# Patient Record
Sex: FEMALE | Race: WHITE | Employment: OTHER | URBAN - METROPOLITAN AREA
[De-identification: names, ages, dates, MRNs, and addresses within clinical notes are randomized per-mention and may not be internally consistent; named-entity substitution may affect disease eponyms.]

---

## 2019-10-15 ENCOUNTER — HOSPITAL ENCOUNTER (EMERGENCY)
Age: 84
Discharge: HOME | End: 2019-10-15
Attending: EMERGENCY MEDICINE
Payer: MEDICARE

## 2019-10-15 ENCOUNTER — APPOINTMENT (OUTPATIENT)
Dept: GENERAL RADIOLOGY | Age: 84
End: 2019-10-15
Attending: EMERGENCY MEDICINE
Payer: MEDICARE

## 2019-10-15 VITALS
RESPIRATION RATE: 20 BRPM | TEMPERATURE: 99.5 F | HEIGHT: 60 IN | DIASTOLIC BLOOD PRESSURE: 84 MMHG | OXYGEN SATURATION: 93 % | WEIGHT: 170 LBS | SYSTOLIC BLOOD PRESSURE: 140 MMHG | BODY MASS INDEX: 33.38 KG/M2 | HEART RATE: 82 BPM

## 2019-10-15 DIAGNOSIS — K59.00 CONSTIPATION, UNSPECIFIED CONSTIPATION TYPE: ICD-10-CM

## 2019-10-15 DIAGNOSIS — J18.9 PNEUMONIA OF RIGHT LOWER LOBE DUE TO INFECTIOUS ORGANISM: Primary | ICD-10-CM

## 2019-10-15 DIAGNOSIS — N30.00 ACUTE CYSTITIS WITHOUT HEMATURIA: ICD-10-CM

## 2019-10-15 LAB
ALBUMIN SERPL-MCNC: 3.4 G/DL (ref 3.5–5)
ALBUMIN/GLOB SERPL: 0.8 {RATIO} (ref 1.1–2.2)
ALP SERPL-CCNC: 92 U/L (ref 45–117)
ALT SERPL-CCNC: 36 U/L (ref 12–78)
ANION GAP SERPL CALC-SCNC: 5 MMOL/L (ref 5–15)
APPEARANCE UR: ABNORMAL
AST SERPL-CCNC: 32 U/L (ref 15–37)
BACTERIA URNS QL MICRO: ABNORMAL /HPF
BASOPHILS # BLD: 0 K/UL (ref 0–0.1)
BASOPHILS NFR BLD: 0 % (ref 0–1)
BILIRUB SERPL-MCNC: 1.2 MG/DL (ref 0.2–1)
BILIRUB UR QL: NEGATIVE
BUN SERPL-MCNC: 16 MG/DL (ref 6–20)
BUN/CREAT SERPL: 37 (ref 12–20)
CALCIUM SERPL-MCNC: 9.3 MG/DL (ref 8.5–10.1)
CHLORIDE SERPL-SCNC: 96 MMOL/L (ref 97–108)
CO2 SERPL-SCNC: 33 MMOL/L (ref 21–32)
COLOR UR: ABNORMAL
CREAT SERPL-MCNC: 0.43 MG/DL (ref 0.55–1.02)
DIFFERENTIAL METHOD BLD: ABNORMAL
EOSINOPHIL # BLD: 0 K/UL (ref 0–0.4)
EOSINOPHIL NFR BLD: 0 % (ref 0–7)
EPITH CASTS URNS QL MICRO: ABNORMAL /LPF
ERYTHROCYTE [DISTWIDTH] IN BLOOD BY AUTOMATED COUNT: 12.9 % (ref 11.5–14.5)
GLOBULIN SER CALC-MCNC: 4.2 G/DL (ref 2–4)
GLUCOSE SERPL-MCNC: 117 MG/DL (ref 65–100)
GLUCOSE UR STRIP.AUTO-MCNC: NEGATIVE MG/DL
HCT VFR BLD AUTO: 43.6 % (ref 35–47)
HGB BLD-MCNC: 13.5 G/DL (ref 11.5–16)
HGB UR QL STRIP: NEGATIVE
IMM GRANULOCYTES # BLD AUTO: 0 K/UL (ref 0–0.04)
IMM GRANULOCYTES NFR BLD AUTO: 0 % (ref 0–0.5)
KETONES UR QL STRIP.AUTO: 15 MG/DL
LEUKOCYTE ESTERASE UR QL STRIP.AUTO: ABNORMAL
LYMPHOCYTES # BLD: 0.7 K/UL (ref 0.8–3.5)
LYMPHOCYTES NFR BLD: 9 % (ref 12–49)
MCH RBC QN AUTO: 31.5 PG (ref 26–34)
MCHC RBC AUTO-ENTMCNC: 31 G/DL (ref 30–36.5)
MCV RBC AUTO: 101.6 FL (ref 80–99)
MONOCYTES # BLD: 0.9 K/UL (ref 0–1)
MONOCYTES NFR BLD: 11 % (ref 5–13)
NEUTS SEG # BLD: 6.2 K/UL (ref 1.8–8)
NEUTS SEG NFR BLD: 80 % (ref 32–75)
NITRITE UR QL STRIP.AUTO: POSITIVE
NRBC # BLD: 0 K/UL (ref 0–0.01)
NRBC BLD-RTO: 0 PER 100 WBC
PH UR STRIP: 7 [PH] (ref 5–8)
PLATELET # BLD AUTO: 228 K/UL (ref 150–400)
PMV BLD AUTO: 9.1 FL (ref 8.9–12.9)
POTASSIUM SERPL-SCNC: 4.1 MMOL/L (ref 3.5–5.1)
PROT SERPL-MCNC: 7.6 G/DL (ref 6.4–8.2)
PROT UR STRIP-MCNC: 30 MG/DL
RBC # BLD AUTO: 4.29 M/UL (ref 3.8–5.2)
RBC #/AREA URNS HPF: ABNORMAL /HPF (ref 0–5)
RBC MORPH BLD: ABNORMAL
SODIUM SERPL-SCNC: 134 MMOL/L (ref 136–145)
SP GR UR REFRACTOMETRY: 1.03 (ref 1–1.03)
UA: UC IF INDICATED,UAUC: ABNORMAL
UROBILINOGEN UR QL STRIP.AUTO: 1 EU/DL (ref 0.2–1)
WBC # BLD AUTO: 7.8 K/UL (ref 3.6–11)
WBC URNS QL MICRO: ABNORMAL /HPF (ref 0–4)

## 2019-10-15 PROCEDURE — 74011250637 HC RX REV CODE- 250/637: Performed by: EMERGENCY MEDICINE

## 2019-10-15 PROCEDURE — 87086 URINE CULTURE/COLONY COUNT: CPT

## 2019-10-15 PROCEDURE — 87186 SC STD MICRODIL/AGAR DIL: CPT

## 2019-10-15 PROCEDURE — 36415 COLL VENOUS BLD VENIPUNCTURE: CPT

## 2019-10-15 PROCEDURE — 85025 COMPLETE CBC W/AUTO DIFF WBC: CPT

## 2019-10-15 PROCEDURE — 87077 CULTURE AEROBIC IDENTIFY: CPT

## 2019-10-15 PROCEDURE — 80053 COMPREHEN METABOLIC PANEL: CPT

## 2019-10-15 PROCEDURE — 71046 X-RAY EXAM CHEST 2 VIEWS: CPT

## 2019-10-15 PROCEDURE — 81001 URINALYSIS AUTO W/SCOPE: CPT

## 2019-10-15 PROCEDURE — 99283 EMERGENCY DEPT VISIT LOW MDM: CPT

## 2019-10-15 PROCEDURE — 87040 BLOOD CULTURE FOR BACTERIA: CPT

## 2019-10-15 RX ORDER — AZITHROMYCIN 250 MG/1
500 TABLET, FILM COATED ORAL
Status: DISCONTINUED | OUTPATIENT
Start: 2019-10-15 | End: 2019-10-15

## 2019-10-15 RX ORDER — POLYETHYLENE GLYCOL 3350 17 G/17G
17 POWDER, FOR SOLUTION ORAL 2 TIMES DAILY
Qty: 1530 G | Refills: 0 | Status: SHIPPED | OUTPATIENT
Start: 2019-10-15

## 2019-10-15 RX ORDER — AZITHROMYCIN 250 MG/1
250 TABLET, FILM COATED ORAL DAILY
Qty: 4 TAB | Refills: 0 | OUTPATIENT
Start: 2019-10-15 | End: 2019-10-15

## 2019-10-15 RX ORDER — LEVOFLOXACIN 500 MG/1
500 TABLET, FILM COATED ORAL DAILY
Qty: 6 TAB | Refills: 0 | Status: SHIPPED | OUTPATIENT
Start: 2019-10-15 | End: 2022-03-18

## 2019-10-15 RX ORDER — LEVOFLOXACIN 500 MG/1
500 TABLET, FILM COATED ORAL
Status: COMPLETED | OUTPATIENT
Start: 2019-10-15 | End: 2019-10-15

## 2019-10-15 RX ORDER — ALBUTEROL SULFATE 90 UG/1
2 AEROSOL, METERED RESPIRATORY (INHALATION)
Qty: 1 INHALER | Refills: 0 | Status: SHIPPED | OUTPATIENT
Start: 2019-10-15 | End: 2022-03-25

## 2019-10-15 RX ORDER — ALBUTEROL SULFATE 90 UG/1
2 AEROSOL, METERED RESPIRATORY (INHALATION)
Qty: 1 INHALER | Refills: 0 | Status: SHIPPED | OUTPATIENT
Start: 2019-10-15 | End: 2019-10-15

## 2019-10-15 RX ORDER — PREDNISONE 10 MG/1
TABLET ORAL
Qty: 48 TAB | Refills: 0 | Status: SHIPPED | OUTPATIENT
Start: 2019-10-15 | End: 2019-10-15

## 2019-10-15 RX ORDER — PREDNISONE 10 MG/1
TABLET ORAL
Qty: 48 TAB | Refills: 0 | Status: SHIPPED | OUTPATIENT
Start: 2019-10-15 | End: 2022-03-18

## 2019-10-15 RX ADMIN — LEVOFLOXACIN 500 MG: 500 TABLET, FILM COATED ORAL at 22:10

## 2019-10-16 NOTE — ED NOTES
Patient was provided with discharge instructions. Instructions and any medications were reviewed with the patient &/or family by the provider. Questions and concerns addressed by the provider. Patient was wheeled out of the ED and was escorted by family.

## 2019-10-16 NOTE — DISCHARGE INSTRUCTIONS
Constipation: Care Instructions  Your Care Instructions    Constipation means that you have a hard time passing stools (bowel movements). People pass stools from 3 times a day to once every 3 days. What is normal for you may be different. Constipation may occur with pain in the rectum and cramping. The pain may get worse when you try to pass stools. Sometimes there are small amounts of bright red blood on toilet paper or the surface of stools. This is because of enlarged veins near the rectum (hemorrhoids). A few changes in your diet and lifestyle may help you avoid ongoing constipation. Your doctor may also prescribe medicine to help loosen your stool. Some medicines can cause constipation. These include pain medicines and antidepressants. Tell your doctor about all the medicines you take. Your doctor may want to make a medicine change to ease your symptoms. Follow-up care is a key part of your treatment and safety. Be sure to make and go to all appointments, and call your doctor if you are having problems. It's also a good idea to know your test results and keep a list of the medicines you take. How can you care for yourself at home? · Drink plenty of fluids, enough so that your urine is light yellow or clear like water. If you have kidney, heart, or liver disease and have to limit fluids, talk with your doctor before you increase the amount of fluids you drink. · Include high-fiber foods in your diet each day. These include fruits, vegetables, beans, and whole grains. · Get at least 30 minutes of exercise on most days of the week. Walking is a good choice. You also may want to do other activities, such as running, swimming, cycling, or playing tennis or team sports. · Take a fiber supplement, such as Citrucel or Metamucil, every day. Read and follow all instructions on the label. · Schedule time each day for a bowel movement. A daily routine may help.  Take your time having your bowel movement. · Support your feet with a small step stool when you sit on the toilet. This helps flex your hips and places your pelvis in a squatting position. · Your doctor may recommend an over-the-counter laxative to relieve your constipation. Examples are Milk of Magnesia and MiraLax. Read and follow all instructions on the label. Do not use laxatives on a long-term basis. When should you call for help? Call your doctor now or seek immediate medical care if:    · You have new or worse belly pain.     · You have new or worse nausea or vomiting.     · You have blood in your stools.    Watch closely for changes in your health, and be sure to contact your doctor if:    · Your constipation is getting worse.     · You do not get better as expected. Where can you learn more? Go to http://lillie-anahi.info/. Enter 21 [phone] in the search box to learn more about \"Constipation: Care Instructions. \"  Current as of: June 26, 2019  Content Version: 12.2  © 5754-4225 [employer]. Care instructions adapted under license by [employer] (which disclaims liability or warranty for this information). If you have questions about a medical condition or this instruction, always ask your healthcare professional. [place] [address] any warranty or liability for your use of this information. Patient Education        Pneumonia: Care Instructions  Your Care Instructions    Pneumonia is an infection of the lungs. Most cases are caused by infections from bacteria or viruses. Pneumonia may be mild or very severe. If it is caused by bacteria, you will be treated with antibiotics. It may take a few weeks to a few months to recover fully from pneumonia, depending on how sick you were and whether your overall health is good. Follow-up care is a key part of your treatment and safety. Be sure to make and go to all appointments, and call your doctor if you are having problems.  It's also a good idea to know your test results and keep a list of the medicines you take. How can you care for yourself at home? · Take your antibiotics exactly as directed. Do not stop taking the medicine just because you are feeling better. You need to take the full course of antibiotics. · Take your medicines exactly as prescribed. Call your doctor if you think you are having a problem with your medicine. · Get plenty of rest and sleep. You may feel weak and tired for a while, but your energy level will improve with time. · To prevent dehydration, drink plenty of fluids, enough so that your urine is light yellow or clear like water. Choose water and other caffeine-free clear liquids until you feel better. If you have kidney, heart, or liver disease and have to limit fluids, talk with your doctor before you increase the amount of fluids you drink. · Take care of your cough so you can rest. A cough that brings up mucus from your lungs is common with pneumonia. It is one way your body gets rid of the infection. But if coughing keeps you from resting or causes severe fatigue and chest-wall pain, talk to your doctor. He or she may suggest that you take a medicine to reduce the cough. · Use a vaporizer or humidifier to add moisture to your bedroom. Follow the directions for cleaning the machine. · Do not smoke or allow others to smoke around you. Smoke will make your cough last longer. If you need help quitting, talk to your doctor about stop-smoking programs and medicines. These can increase your chances of quitting for good. · Take an over-the-counter pain medicine, such as acetaminophen (Tylenol), ibuprofen (Advil, Motrin), or naproxen (Aleve). Read and follow all instructions on the label. · Do not take two or more pain medicines at the same time unless the doctor told you to. Many pain medicines have acetaminophen, which is Tylenol. Too much acetaminophen (Tylenol) can be harmful.   · If you were given a spirometer to measure how well your lungs are working, use it as instructed. This can help your doctor tell how your recovery is going. · To prevent pneumonia in the future, talk to your doctor about getting a flu vaccine (once a year) and a pneumococcal vaccine (one time only for most people). When should you call for help? Call 911 anytime you think you may need emergency care. For example, call if:    · You have severe trouble breathing.    Call your doctor now or seek immediate medical care if:    · You cough up dark brown or bloody mucus (sputum).     · You have new or worse trouble breathing.     · You are dizzy or lightheaded, or you feel like you may faint.    Watch closely for changes in your health, and be sure to contact your doctor if:    · You have a new or higher fever.     · You are coughing more deeply or more often.     · You are not getting better after 2 days (48 hours).     · You do not get better as expected. Where can you learn more? Go to http://lillie-anahi.info/. Enter [id number] in the search box to learn more about \"Pneumonia: Care Instructions. \"  Current as of: June 9, 2019  Content Version: 12.2  © 2121-6785 [employer]. Care instructions adapted under license by [employer] (which disclaims liability or warranty for this information). If you have questions about a medical condition or this instruction, always ask your healthcare professional. [place] [address] any warranty or liability for your use of this information.        Local Primary Care Physicians     [facility] [phone]   MD Zoraida Quezada MD     [facility] [phone]   Azeem Brambila, MD Harvinder Elaine MD Norva Maffucci, MD [address] [phone]   Alessandro Best MD    [facility] [place] [phone]   MD Itzel Casas MD Nahun Luciano MD Josiah Osier, MD     [facility] [phone]   [facility], MD Patricia Gee, MD Chere Lombard, NP     [address] [phone]   Ab Benitez, MD Megha Mendez, MD Linden Suresh, MD Catia Sanford, MD Tera Kim, MD Andrzej Rodrigues MD     [address] [facility]   Basai Pineda MD    [facility] [phone]   Neeraj Vines, MD Yessenia Weir, NP   Arnaud Norwood, MD Andre Prajapati, MD La Nena Hatch, MD Harrison Amaya, MD     [address] [place] [address] [phone]   Tanmay Lopez, MD Everton Guardado, P   Jose David Mcdaniel, BEAN Banks, MD Reena Dobbins, MD Rosanne Stokes, MD DEVIN [facility] [phone]   Tang Fletcher MD   [facility]  Michelle Trevino, MD Ken Henderson, MD John Willis MD     [address] [phone]   MD Martina Bello MD [facility] [phone]   MD Any Gordon MD Stephane Rucks, MD     [facility] [phone]   MD Lili Arias MD Jinnie Barter, MD Olla Mix, MD Jeffrie Client, BEAN Narayan MD     [facility] [phone]   [facility], MD Rachid Walker MD     [address] [facility] [phone] 1535 MD Tri Knight, JEFFERY Queen, JEFFERY Stark MD Vallerie Model, NP   Lucy Simon,      Miscellaneous:     [facility] Departments    For adult and child immunizations, family planning, TB screening, STD testing and women's health services.   [facility] [address] [place] [address]   [phone]   [facility]   [phone]   [address] [phone] 24[address] [facility]        [address] [place] [address]    For primary care services, woman and child wellness, and some clinics providing specialty care. [facility] [address] [facility] [phone]  [address] [place] [address] [facility] [address] [facility] 3614 [facility] [phone]  [address] [facility] [address]  [phone]  [address]  [employer] 160[address] [facility] 5850  Community  [phone]  [address] [phone]  [facility] 81 [facility] [phone]  [place] [address] [phone]  [facility] [address] [facility] [phone]    [facility] [address] [phone]  [facility] 5850  Community  [phone]  [employer]  [address], [address] ([email or web address]) [phone]       Sexual Health/Woman Wellness Clinics   For STD/HIV testing and treatment, pregnancy testing and services, men's health, birth control services, LGBT services, and hepatitis/HPV vaccine services. [employer] [employer] for [employer] [address] [facility] [address] [facility] [address] [facility] [address] [phone]  [facility] [address] 5th floor [phone]  Pregnancy [address] [facility] 3550 [facility] [address]  Celia Larve [phone]       8260 [facility] [address] [facility],[address]   [phone]  [place]   [phone]  Women, Infant and Children's Services: Gabe      600 [facility] Intervention [address] Psychiatry     [phone]  1325 [facility]   [phone]  [address] [facility] [phone]    Thank you for allowing us to provide you with excellent care today. We hope we addressed all of your concerns and needs. We strive to provide excellent quality care in the Emergency Department. Please rate us as excellent, as anything less than excellent does not meet our expectations. If you feel that you have not received excellent quality care or timely care, please ask to speak to the nurse manager. Please choose us in the future for your continued health care needs. The exam and treatment you received in the Emergency Department were for an urgent problem and are not intended as complete care. It is important that you follow up with a doctor, nurse practitioner, or physician assistant for ongoing care. If your symptoms become worse or you do not improve as expected and you are unable to reach your usual health care provider, you should return to the Emergency Department. We are available 24 hours a day. Take this sheet with you when you go to your follow-up visit. If you have any problem arranging the follow-up visit, contact the Emergency Department immediately. If a prescription has been provided, please have it filled as soon as possible to avoid a delay in treatment. Read the entire medication instruction sheet provided to you by the pharmacy. If you have any questions or reservations about taking the medication due to side effects or interactions with other medications please call the ER or your primary care physician. Take this sheet with you when you go to your follow-up visit. Make an appointment with your family doctor or the physician you were referred to for follow-up of this visit, as this is mandatory follow-up.  Return to the ER if you are unable to be seen or if you are unable to be seen in a timely manner. If you have any problem arranging the follow-up visit, contact the Emergency Department immediately.

## 2019-10-16 NOTE — ED PROVIDER NOTES
EMERGENCY DEPARTMENT HISTORY AND PHYSICAL EXAM     ------------------------------------------------------------------------------------------------------  Please note that this dictation was completed with [employer], the [employer] voice recognition software. Quite often unanticipated grammatical, syntax, homophones, and other interpretive errors are inadvertently transcribed by the computer software. Please disregard these errors. Please excuse any errors that have escaped final proofreading.  -----------------------------------------------------------------------------------------------------------------    Date: 10/15/2019  Patient Name: Kai Cates    History of Presenting Illness     Chief Complaint   Patient presents with    Fever     started this morning    Chest Congestion     3-4 days    Cough     productive        History Provided By: Patient    HPI: Kai Cates is a 80 y.o. female, with significant pmhx of hypertension, who presents ambulatory to the ED with c/o several days of feeling sluggish and fatigue. Have shortness of breath with congestion as well. Noted she has not made an appointment with her primary care doctor Dr. Jennifer Barger for tomorrow but felt more exhausted with a temperature of 100 and decided to come to the emergency department for further evaluation. Patient also with history of urinary incontinence but denies other urinary symptoms. Also notes being constipated for the last several days but denies taking any medication to alleviate her symptoms. No associated nausea or vomiting, chest pain, abdominal pain. PCP: Kaykay Clarke MD    Social Hx: denies tobacco, denies EtOH, denies Illicit Drugs     There are no other complaints, changes, or physical findings at this time.      Allergies   Allergen Reactions    Dilantin [Phenytoin Sodium Extended] Rash    Penicillin G Rash         Current Facility-Administered Medications   Medication Dose Route Frequency Provider Last Rate Last Dose    levoFLOXacin (LEVAQUIN) tablet 500 mg  500 mg Oral NOW Babette Kawasaki, MD         Current Outpatient Medications   Medication Sig Dispense Refill    polyethylene glycol (MIRALAX) 17 gram/dose powder Take 17 g by mouth two (2) times a day. 1 tablespoon with 8 oz of water daily  Indications: constipation 1530 g 0    levoFLOXacin (LEVAQUIN) 500 mg tablet Take 1 Tab by mouth daily. 6 Tab 0    predniSONE (STERAPRED DS) 10 mg dose pack Take as directed on packaging 48 Tab 0    albuterol (PROVENTIL HFA, VENTOLIN HFA, PROAIR HFA) 90 mcg/actuation inhaler Take 2 Puffs by inhalation every four (4) hours as needed for Wheezing. 1 Inhaler 0       Past History     Past Medical History:  Past Medical History:   Diagnosis Date    Asthma     Hiatal hernia        Past Surgical History:  Past Surgical History:   Procedure Laterality Date    HX GI      \"Multiple abdominal surgeries. \"    HX HIP REPLACEMENT      HX OTHER SURGICAL      \"Brain Surgery\". Family History:  History reviewed. No pertinent family history. Social History:  Social History     Tobacco Use    Smoking status: Never Smoker    Smokeless tobacco: Never Used   Substance Use Topics    Alcohol use: Not Currently    Drug use: Never       Allergies: Allergies   Allergen Reactions    Dilantin [Phenytoin Sodium Extended] Rash    Penicillin G Rash         Review of Systems   Review of Systems   Constitutional: Positive for fatigue. Negative for fever. Eyes: Negative. Respiratory: Negative. Negative for shortness of breath. Cardiovascular: Negative for chest pain. Gastrointestinal: Positive for constipation. Negative for abdominal pain, nausea and vomiting. Endocrine: Negative. Genitourinary: Negative. Negative for difficulty urinating, dysuria and hematuria. Musculoskeletal: Negative. Skin: Negative. Neurological: Negative. Psychiatric/Behavioral: Negative for suicidal ideas.    All other systems reviewed and are negative. Physical Exam   Physical Exam   Constitutional: She is oriented to person, place, and time. She appears well-developed and well-nourished. No distress. HENT:   Head: Normocephalic and atraumatic. Nose: Nose normal.   Eyes: Conjunctivae and EOM are normal. No scleral icterus. Neck: Normal range of motion. No tracheal deviation present. Cardiovascular: Normal rate, regular rhythm, normal heart sounds and intact distal pulses. Exam reveals no friction rub. No murmur heard. Pulmonary/Chest: Effort normal and breath sounds normal. No stridor. No tachypnea. No respiratory distress. She has no wheezes. She has no rales. Pt able to speak in full, unlabored sentences. Abdominal: Soft. She exhibits no distension. Bowel sounds are decreased. There is no tenderness. There is no rebound. Musculoskeletal: Normal range of motion. She exhibits no tenderness. Neurological: She is alert and oriented to person, place, and time. No cranial nerve deficit. Skin: Skin is warm and dry. No rash noted. She is not diaphoretic. Psychiatric: She has a normal mood and affect. Her speech is normal and behavior is normal. Judgment and thought content normal. Cognition and memory are normal.   Nursing note and vitals reviewed.         Diagnostic Study Results     Labs -     Recent Results (from the past 12 hour(s))   CBC WITH AUTOMATED DIFF    Collection Time: 10/15/19  8:06 PM   Result Value Ref Range    WBC 7.8 3.6 - 11.0 K/uL    RBC 4.29 3.80 - 5.20 M/uL    HGB 13.5 11.5 - 16.0 g/dL    HCT 43.6 35.0 - 47.0 %    .6 (H) 80.0 - 99.0 FL    MCH 31.5 26.0 - 34.0 PG    MCHC 31.0 30.0 - 36.5 g/dL    RDW 12.9 11.5 - 14.5 %    PLATELET 471 034 - 477 K/uL    MPV 9.1 8.9 - 12.9 FL    NRBC 0.0 0  WBC    ABSOLUTE NRBC 0.00 0.00 - 0.01 K/uL    NEUTROPHILS 80 (H) 32 - 75 %    LYMPHOCYTES 9 (L) 12 - 49 %    MONOCYTES 11 5 - 13 %    EOSINOPHILS 0 0 - 7 %    BASOPHILS 0 0 - 1 %    IMMATURE GRANULOCYTES 0 0.0 - 0.5 %    ABS. NEUTROPHILS 6.2 1.8 - 8.0 K/UL    ABS. LYMPHOCYTES 0.7 (L) 0.8 - 3.5 K/UL    ABS. MONOCYTES 0.9 0.0 - 1.0 K/UL    ABS. EOSINOPHILS 0.0 0.0 - 0.4 K/UL    ABS. BASOPHILS 0.0 0.0 - 0.1 K/UL    ABS. IMM. GRANS. 0.0 0.00 - 0.04 K/UL    DF AUTOMATED      RBC COMMENTS MACROCYTOSIS  1+       METABOLIC PANEL, COMPREHENSIVE    Collection Time: 10/15/19  8:06 PM   Result Value Ref Range    Sodium 134 (L) 136 - 145 mmol/L    Potassium 4.1 3.5 - 5.1 mmol/L    Chloride 96 (L) 97 - 108 mmol/L    CO2 33 (H) 21 - 32 mmol/L    Anion gap 5 5 - 15 mmol/L    Glucose 117 (H) 65 - 100 mg/dL    BUN 16 6 - 20 MG/DL    Creatinine 0.43 (L) 0.55 - 1.02 MG/DL    BUN/Creatinine ratio 37 (H) 12 - 20      GFR est AA >60 >60 ml/min/1.73m2    GFR est non-AA >60 >60 ml/min/1.73m2    Calcium 9.3 8.5 - 10.1 MG/DL    Bilirubin, total 1.2 (H) 0.2 - 1.0 MG/DL    ALT (SGPT) 36 12 - 78 U/L    AST (SGOT) 32 15 - 37 U/L    Alk. phosphatase 92 45 - 117 U/L    Protein, total 7.6 6.4 - 8.2 g/dL    Albumin 3.4 (L) 3.5 - 5.0 g/dL    Globulin 4.2 (H) 2.0 - 4.0 g/dL    A-G Ratio 0.8 (L) 1.1 - 2.2     URINALYSIS W/ REFLEX CULTURE    Collection Time: 10/15/19  9:29 PM   Result Value Ref Range    Color DARK YELLOW      Appearance CLOUDY (A) CLEAR      Specific gravity 1.028 1.003 - 1.030      pH (UA) 7.0 5.0 - 8.0      Protein 30 (A) NEG mg/dL    Glucose NEGATIVE  NEG mg/dL    Ketone 15 (A) NEG mg/dL    Bilirubin NEGATIVE  NEG      Blood NEGATIVE  NEG      Urobilinogen 1.0 0.2 - 1.0 EU/dL    Nitrites POSITIVE (A) NEG      Leukocyte Esterase TRACE (A) NEG      WBC PENDING /hpf    RBC PENDING /hpf    Epithelial cells PENDING /lpf    Bacteria PENDING /hpf    UA:UC IF INDICATED PENDING        Radiologic Studies -   XR CHEST PA LAT   Final Result   IMPRESSION: Right lower lobe airspace opacification.         CT Results  (Last 48 hours)    None        CXR Results  (Last 48 hours)               10/15/19 1926  XR CHEST PA LAT Final result Impression:  IMPRESSION: Right lower lobe airspace opacification. Narrative:  History: Productive cough. 2 views of the chest demonstrate cardiomegaly. There is airspace disease in the   right lower lobe. There are severe degenerative changes of the shoulders with   deformity of the right proximal humerus. There are degenerative changes of the   spine. Medical Decision Making   I am the first provider for this patient. I reviewed the vital signs, available nursing notes, past medical history, past surgical history, family history and social history. Vital Signs-Reviewed the patient's vital signs. Patient Vitals for the past 12 hrs:   Temp Pulse Resp BP SpO2   10/15/19 1832 99.5 °F (37.5 °C) 82 20 140/84 93 %       Pulse Oximetry Analysis - 93% on RA    Records Reviewed: Nursing Notes, Old Medical Records, Previous Radiology Studies and Previous Laboratory Studies    Provider Notes (Medical Decision Making):     DDX:  Pneumonia, URI, UTI, electrolyte abnormality, dehydration, constipation    Plan:  Labs, UA, chest x-ray    Impression:  Upper respiratory infection and UTI    ED Course:   Initial assessment performed. The patients presenting problems have been discussed, and they are in agreement with the care plan formulated and outlined with them. I have encouraged them to ask questions as they arise throughout their visit. I reviewed our electronic medical record system for any past medical records that were available that may contribute to the patients current condition, the nursing notes and and vital signs from today's visit  Nursing notes will be reviewed as they become available in realtime while the pt has been in the ED. Traci Heredia MD    I personally reviewed pt's imaging. Official read by radiology noted above. Traci Heredia MD      10:02 PM  Progress note:  Pt noted to be feeling better, ready for discharge.  Discussed lab and imaging findings with pt and/or family, specifically noting UTI and upper respiratory infection on chest x-ray. Pt will follow up with Dr. Lida Russell tomorrow as instructed. All questions have been answered, pt voiced understanding and agreement with plan. Specific return precautions provided in addition to instructions for pt to return to the ED immediately should sx worsen at any time. Celsa Jolly MD             Critical Care Time:     none      Diagnosis     Clinical Impression:   1. Pneumonia of right lower lobe due to infectious organism ([address]    2. Constipation, unspecified constipation type    3. Acute cystitis without hematuria        PLAN:  1. Current Discharge Medication List      START taking these medications    Details   polyethylene glycol (MIRALAX) 17 gram/dose powder Take 17 g by mouth two (2) times a day. 1 tablespoon with 8 oz of water daily  Indications: constipation  Qty: 1530 g, Refills: 0    Comments: Take bid for 7 days and then daily      levoFLOXacin (LEVAQUIN) 500 mg tablet Take 1 Tab by mouth daily. Qty: 6 Tab, Refills: 0      predniSONE (STERAPRED DS) 10 mg dose pack Take as directed on packaging  Qty: 48 Tab, Refills: 0      albuterol (PROVENTIL HFA, VENTOLIN HFA, PROAIR HFA) 90 mcg/actuation inhaler Take 2 Puffs by inhalation every four (4) hours as needed for Wheezing. Qty: 1 Inhaler, Refills: 0           2. Follow-up Information     Follow up With Specialties Details Why Contact Info    [facility] EMERGENCY DEPT Emergency Medicine  As needed [address] [place] [address]  [phone]        Return to ED if worse     Disposition:    10:02 PM   The patient's results have been reviewed with family and/or caregiver.  They verbally convey their understanding and agreement of the patient's signs, symptoms, diagnosis, treatment and prognosis and additionally agree to follow up as recommended in the discharge instructions or to return to the Emergency Room should the patient's condition change prior to their follow-up appointment. The family and/or caregiver verbally agrees with the care-plan and all of their questions have been answered. The discharge instructions have also been provided to the them with educational information regarding the patient's diagnosis as well a list of reasons why the patient would want to return to the ER prior to their follow-up appointment should their condition change. Celsa Jolly MD          This note will not be viewable in [address]

## 2019-10-18 LAB
BACTERIA SPEC CULT: ABNORMAL
CC UR VC: ABNORMAL
SERVICE CMNT-IMP: ABNORMAL

## 2019-10-20 LAB
BACTERIA SPEC CULT: NORMAL
SERVICE CMNT-IMP: NORMAL

## 2020-09-03 ENCOUNTER — HOSPITAL ENCOUNTER (OUTPATIENT)
Dept: GENERAL RADIOLOGY | Age: 85
Discharge: HOME | End: 2020-09-03
Attending: FAMILY MEDICINE
Payer: MEDICARE

## 2020-09-03 DIAGNOSIS — R13.10 DYSPHAGIA: ICD-10-CM

## 2020-09-03 PROCEDURE — 74220 X-RAY XM ESOPHAGUS 1CNTRST: CPT

## 2020-11-06 ENCOUNTER — TRANSCRIBE ORDER (OUTPATIENT)
Dept: SCHEDULING | Age: 85
End: 2020-11-06

## 2020-11-06 DIAGNOSIS — R13.19 ESOPHAGEAL DYSPHAGIA: Primary | ICD-10-CM

## 2020-11-06 DIAGNOSIS — K44.9 HIATAL HERNIA: ICD-10-CM

## 2020-11-27 ENCOUNTER — HOSPITAL ENCOUNTER (OUTPATIENT)
Dept: GENERAL RADIOLOGY | Age: 85
Discharge: HOME | End: 2020-11-27
Attending: PHYSICIAN ASSISTANT
Payer: MEDICARE

## 2020-11-27 DIAGNOSIS — R13.19 ESOPHAGEAL DYSPHAGIA: ICD-10-CM

## 2020-11-27 DIAGNOSIS — K44.9 HIATAL HERNIA: ICD-10-CM

## 2020-11-27 PROCEDURE — 74220 X-RAY XM ESOPHAGUS 1CNTRST: CPT

## 2022-01-26 ENCOUNTER — TRANSCRIBE ORDER (OUTPATIENT)
Dept: SCHEDULING | Age: 87
End: 2022-01-26

## 2022-01-26 DIAGNOSIS — I73.9 PERIPHERAL VASCULAR DISEASE, UNSPECIFIED (HCC): Primary | ICD-10-CM

## 2022-01-31 ENCOUNTER — HOSPITAL ENCOUNTER (OUTPATIENT)
Dept: VASCULAR SURGERY | Age: 87
Discharge: HOME | End: 2022-01-31
Attending: PODIATRIST
Payer: MEDICARE

## 2022-01-31 DIAGNOSIS — I73.9 PERIPHERAL VASCULAR DISEASE, UNSPECIFIED (HCC): ICD-10-CM

## 2022-01-31 PROCEDURE — 93922 UPR/L XTREMITY ART 2 LEVELS: CPT

## 2022-02-02 LAB
LEFT ABI: 1.22
LEFT ARM BP: 114 MMHG
LEFT POSTERIOR TIBIAL: 137 MMHG
LEFT TBI: 0.69
LEFT TOE PRESSURE: 79 MMHG
RIGHT ABI: 1.18
RIGHT ARM BP: 114 MMHG
RIGHT POSTERIOR TIBIAL: 129 MMHG
RIGHT TBI: 0.8
RIGHT TOE PRESSURE: 91 MMHG
VAS LEFT DORSALIS PEDIS BP: 139 MMHG
VAS RIGHT DORSALIS PEDIS BP: 135 MMHG

## 2022-03-14 ENCOUNTER — APPOINTMENT (OUTPATIENT)
Dept: GENERAL RADIOLOGY | Age: 87
DRG: 291 | End: 2022-03-14
Attending: STUDENT IN AN ORGANIZED HEALTH CARE EDUCATION/TRAINING PROGRAM
Payer: MEDICARE

## 2022-03-14 ENCOUNTER — HOSPITAL ENCOUNTER (INPATIENT)
Age: 87
LOS: 4 days | Discharge: HOME HEALTH | DRG: 291 | End: 2022-03-18
Attending: STUDENT IN AN ORGANIZED HEALTH CARE EDUCATION/TRAINING PROGRAM | Admitting: STUDENT IN AN ORGANIZED HEALTH CARE EDUCATION/TRAINING PROGRAM
Payer: MEDICARE

## 2022-03-14 DIAGNOSIS — R06.02 SHORTNESS OF BREATH: ICD-10-CM

## 2022-03-14 DIAGNOSIS — I10 PRIMARY HYPERTENSION: ICD-10-CM

## 2022-03-14 DIAGNOSIS — I50.9 ACUTE ON CHRONIC CONGESTIVE HEART FAILURE, UNSPECIFIED HEART FAILURE TYPE (HCC): Primary | ICD-10-CM

## 2022-03-14 DIAGNOSIS — K44.9 HIATAL HERNIA: ICD-10-CM

## 2022-03-14 DIAGNOSIS — I50.23 ACUTE ON CHRONIC SYSTOLIC (CONGESTIVE) HEART FAILURE (HCC): ICD-10-CM

## 2022-03-14 DIAGNOSIS — R77.8 ELEVATED TROPONIN: ICD-10-CM

## 2022-03-14 DIAGNOSIS — I48.0 PAF (PAROXYSMAL ATRIAL FIBRILLATION) (HCC): ICD-10-CM

## 2022-03-14 DIAGNOSIS — E78.2 MIXED HYPERLIPIDEMIA: ICD-10-CM

## 2022-03-14 DIAGNOSIS — R10.13 ABDOMINAL PAIN, EPIGASTRIC: ICD-10-CM

## 2022-03-14 PROBLEM — E87.1 HYPONATREMIA: Status: ACTIVE | Noted: 2022-03-14

## 2022-03-14 LAB
ALBUMIN SERPL-MCNC: 3.8 G/DL (ref 3.5–5)
ALBUMIN/GLOB SERPL: 1.3 {RATIO} (ref 1.1–2.2)
ALP SERPL-CCNC: 41 U/L (ref 45–117)
ALT SERPL-CCNC: 24 U/L (ref 12–78)
ANION GAP SERPL CALC-SCNC: 3 MMOL/L (ref 5–15)
AST SERPL-CCNC: 26 U/L (ref 15–37)
BASOPHILS # BLD: 0 K/UL (ref 0–0.1)
BASOPHILS NFR BLD: 0 % (ref 0–1)
BILIRUB SERPL-MCNC: 0.7 MG/DL (ref 0.2–1)
BNP SERPL-MCNC: 9117 PG/ML
BUN SERPL-MCNC: 31 MG/DL (ref 6–20)
BUN/CREAT SERPL: 53 (ref 12–20)
CALCIUM SERPL-MCNC: 8.8 MG/DL (ref 8.5–10.1)
CHLORIDE SERPL-SCNC: 97 MMOL/L (ref 97–108)
CO2 SERPL-SCNC: 32 MMOL/L (ref 21–32)
CREAT SERPL-MCNC: 0.58 MG/DL (ref 0.55–1.02)
DIFFERENTIAL METHOD BLD: ABNORMAL
EOSINOPHIL # BLD: 0 K/UL (ref 0–0.4)
EOSINOPHIL NFR BLD: 0 % (ref 0–7)
ERYTHROCYTE [DISTWIDTH] IN BLOOD BY AUTOMATED COUNT: 12.7 % (ref 11.5–14.5)
GLOBULIN SER CALC-MCNC: 3 G/DL (ref 2–4)
GLUCOSE SERPL-MCNC: 129 MG/DL (ref 65–100)
HCT VFR BLD AUTO: 41.5 % (ref 35–47)
HGB BLD-MCNC: 13 G/DL (ref 11.5–16)
IMM GRANULOCYTES # BLD AUTO: 0 K/UL (ref 0–0.04)
IMM GRANULOCYTES NFR BLD AUTO: 0 % (ref 0–0.5)
LIPASE SERPL-CCNC: 64 U/L (ref 73–393)
LYMPHOCYTES # BLD: 0.7 K/UL (ref 0.8–3.5)
LYMPHOCYTES NFR BLD: 9 % (ref 12–49)
MCH RBC QN AUTO: 31.5 PG (ref 26–34)
MCHC RBC AUTO-ENTMCNC: 31.3 G/DL (ref 30–36.5)
MCV RBC AUTO: 100.5 FL (ref 80–99)
MONOCYTES # BLD: 0.5 K/UL (ref 0–1)
MONOCYTES NFR BLD: 7 % (ref 5–13)
NEUTS SEG # BLD: 6.2 K/UL (ref 1.8–8)
NEUTS SEG NFR BLD: 84 % (ref 32–75)
NRBC # BLD: 0 K/UL (ref 0–0.01)
NRBC BLD-RTO: 0 PER 100 WBC
PLATELET # BLD AUTO: 200 K/UL (ref 150–400)
PMV BLD AUTO: 10.2 FL (ref 8.9–12.9)
POTASSIUM SERPL-SCNC: 4.5 MMOL/L (ref 3.5–5.1)
PROT SERPL-MCNC: 6.8 G/DL (ref 6.4–8.2)
RBC # BLD AUTO: 4.13 M/UL (ref 3.8–5.2)
RBC MORPH BLD: ABNORMAL
SODIUM SERPL-SCNC: 132 MMOL/L (ref 136–145)
TROPONIN-HIGH SENSITIVITY: 65 NG/L (ref 0–51)
TROPONIN-HIGH SENSITIVITY: 65 NG/L (ref 0–51)
WBC # BLD AUTO: 7.4 K/UL (ref 3.6–11)
WBC MORPH BLD: ABNORMAL

## 2022-03-14 PROCEDURE — 74011000250 HC RX REV CODE- 250: Performed by: STUDENT IN AN ORGANIZED HEALTH CARE EDUCATION/TRAINING PROGRAM

## 2022-03-14 PROCEDURE — 84484 ASSAY OF TROPONIN QUANT: CPT

## 2022-03-14 PROCEDURE — 85025 COMPLETE CBC W/AUTO DIFF WBC: CPT

## 2022-03-14 PROCEDURE — 80053 COMPREHEN METABOLIC PANEL: CPT

## 2022-03-14 PROCEDURE — C9113 INJ PANTOPRAZOLE SODIUM, VIA: HCPCS | Performed by: STUDENT IN AN ORGANIZED HEALTH CARE EDUCATION/TRAINING PROGRAM

## 2022-03-14 PROCEDURE — 65660000000 HC RM CCU STEPDOWN

## 2022-03-14 PROCEDURE — 93005 ELECTROCARDIOGRAM TRACING: CPT

## 2022-03-14 PROCEDURE — 71046 X-RAY EXAM CHEST 2 VIEWS: CPT

## 2022-03-14 PROCEDURE — 74011250637 HC RX REV CODE- 250/637: Performed by: STUDENT IN AN ORGANIZED HEALTH CARE EDUCATION/TRAINING PROGRAM

## 2022-03-14 PROCEDURE — 83880 ASSAY OF NATRIURETIC PEPTIDE: CPT

## 2022-03-14 PROCEDURE — 36415 COLL VENOUS BLD VENIPUNCTURE: CPT

## 2022-03-14 PROCEDURE — 99285 EMERGENCY DEPT VISIT HI MDM: CPT

## 2022-03-14 PROCEDURE — 74011250636 HC RX REV CODE- 250/636: Performed by: STUDENT IN AN ORGANIZED HEALTH CARE EDUCATION/TRAINING PROGRAM

## 2022-03-14 PROCEDURE — 83690 ASSAY OF LIPASE: CPT

## 2022-03-14 RX ORDER — SODIUM CHLORIDE 0.9 % (FLUSH) 0.9 %
5-40 SYRINGE (ML) INJECTION AS NEEDED
Status: DISCONTINUED | OUTPATIENT
Start: 2022-03-14 | End: 2022-03-18 | Stop reason: HOSPADM

## 2022-03-14 RX ORDER — ENOXAPARIN SODIUM 100 MG/ML
40 INJECTION SUBCUTANEOUS DAILY
Status: DISCONTINUED | OUTPATIENT
Start: 2022-03-15 | End: 2022-03-15

## 2022-03-14 RX ORDER — FUROSEMIDE 10 MG/ML
40 INJECTION INTRAMUSCULAR; INTRAVENOUS 2 TIMES DAILY
Status: DISCONTINUED | OUTPATIENT
Start: 2022-03-15 | End: 2022-03-17

## 2022-03-14 RX ORDER — ACETAMINOPHEN 325 MG/1
650 TABLET ORAL
Status: DISCONTINUED | OUTPATIENT
Start: 2022-03-14 | End: 2022-03-18 | Stop reason: HOSPADM

## 2022-03-14 RX ORDER — PANTOPRAZOLE SODIUM 40 MG/1
40 TABLET, DELAYED RELEASE ORAL
Status: DISCONTINUED | OUTPATIENT
Start: 2022-03-15 | End: 2022-03-18 | Stop reason: HOSPADM

## 2022-03-14 RX ORDER — FUROSEMIDE 10 MG/ML
40 INJECTION INTRAMUSCULAR; INTRAVENOUS
Status: COMPLETED | OUTPATIENT
Start: 2022-03-14 | End: 2022-03-14

## 2022-03-14 RX ORDER — SODIUM CHLORIDE 0.9 % (FLUSH) 0.9 %
5-40 SYRINGE (ML) INJECTION EVERY 8 HOURS
Status: DISCONTINUED | OUTPATIENT
Start: 2022-03-14 | End: 2022-03-18 | Stop reason: HOSPADM

## 2022-03-14 RX ORDER — ONDANSETRON 2 MG/ML
4 INJECTION INTRAMUSCULAR; INTRAVENOUS
Status: DISCONTINUED | OUTPATIENT
Start: 2022-03-14 | End: 2022-03-18 | Stop reason: HOSPADM

## 2022-03-14 RX ORDER — GUAIFENESIN 100 MG/5ML
162 LIQUID (ML) ORAL
Status: COMPLETED | OUTPATIENT
Start: 2022-03-14 | End: 2022-03-14

## 2022-03-14 RX ORDER — POLYETHYLENE GLYCOL 3350 17 G/17G
17 POWDER, FOR SOLUTION ORAL DAILY PRN
Status: DISCONTINUED | OUTPATIENT
Start: 2022-03-14 | End: 2022-03-18 | Stop reason: HOSPADM

## 2022-03-14 RX ADMIN — SODIUM CHLORIDE 40 MG: 9 INJECTION INTRAMUSCULAR; INTRAVENOUS; SUBCUTANEOUS at 18:31

## 2022-03-14 RX ADMIN — ASPIRIN 162 MG: 81 TABLET, CHEWABLE ORAL at 18:28

## 2022-03-14 RX ADMIN — FUROSEMIDE 40 MG: 10 INJECTION, SOLUTION INTRAMUSCULAR; INTRAVENOUS at 18:29

## 2022-03-14 RX ADMIN — SODIUM CHLORIDE, PRESERVATIVE FREE 10 ML: 5 INJECTION INTRAVENOUS at 22:00

## 2022-03-14 NOTE — ED PROVIDER NOTES
EMERGENCY DEPARTMENT HISTORY AND PHYSICAL EXAM      Date: 3/14/2022  Patient Name: Kiran Rudd    History of Presenting Illness     Chief Complaint   Patient presents with    Shortness of Breath     pt via wheelchair into triage with cc of SOB x3 months, upper abd. pain x 1 day, nausea, fatigue. pt has a stress test scheduled for next week. primary complaint is abd pain today    Abdominal Pain       History Provided By: Patient and son    HPI: Kiran Rudd, 80 y.o. female with past medical history of CAD, CVA, GERD, hiatal hernia, hypertension, A. fib, presents to the ED with cc of epigastric abdominal pain associated with increased belching and nausea, decreased appetite, and fatigue. Patient reports she has had her omeprazole recently increased from 20 mg twice daily to 40 mg twice daily, despite this, she feels that her GERD symptoms are likely worsening. She states that her physicians have told her it is too dangerous at her age to undergo repair for her large hiatal hernia. She denies any vomiting. Tells me that she has not been eating very much recently due to loss of appetite, and has subsequently lost weight. In addition to the above complaint, patient also complains of 3 months of progressive shortness of breath. She endorses associated orthopnea, progressive bilateral lower extremity edema. She follows with cardiology \"Dr. Chanelle Tam" at [facility], who recently put her on 20 mg of Lasix daily. Despite taking this, patient feels that her symptoms are continuing to worsen. She reports that she is feeling extremely short of breath at the time of my evaluation. She denies any chest pain. She tells me that her cardiologist has scheduled her for a stress test at the end of this month. PCP: Wallis Eisenmenger, MD    No current facility-administered medications on file prior to encounter.      Current Outpatient Medications on File Prior to Encounter   Medication Sig Dispense Refill    polyethylene glycol (MIRALAX) 17 gram/dose powder Take 17 g by mouth two (2) times a day. 1 tablespoon with 8 oz of water daily  Indications: constipation 1530 g 0    levoFLOXacin (LEVAQUIN) 500 mg tablet Take 1 Tab by mouth daily. 6 Tab 0    predniSONE (STERAPRED DS) 10 mg dose pack Take as directed on packaging 48 Tab 0    albuterol (PROVENTIL HFA, VENTOLIN HFA, PROAIR HFA) 90 mcg/actuation inhaler Take 2 Puffs by inhalation every four (4) hours as needed for Wheezing. 1 Inhaler 0       Past History     Past Medical History:  Past Medical History:   Diagnosis Date    Asthma     Hiatal hernia        Past Surgical History:  Past Surgical History:   Procedure Laterality Date    HX GI      \"Multiple abdominal surgeries. \"    HX HIP REPLACEMENT      HX OTHER SURGICAL      \"Brain Surgery\". Family History:  No family history on file. Social History:  Social History     Tobacco Use    Smoking status: Never Smoker    Smokeless tobacco: Never Used   Substance Use Topics    Alcohol use: Not Currently    Drug use: Never       Allergies: Allergies   Allergen Reactions    Dilantin [Phenytoin Sodium Extended] Rash    Penicillin G Rash         Review of Systems   Review of Systems   Constitutional: Positive for appetite change, fatigue and unexpected weight change. Negative for chills and fever. HENT: Negative for congestion and rhinorrhea. Eyes: Negative for visual disturbance. Respiratory: Positive for shortness of breath. Negative for cough, chest tightness and wheezing. Cardiovascular: Positive for leg swelling. Negative for chest pain and palpitations. Gastrointestinal: Positive for abdominal pain and nausea. Negative for diarrhea and vomiting. Increased belching   Genitourinary: Negative for decreased urine volume, difficulty urinating, dysuria, flank pain, frequency, hematuria and urgency. Musculoskeletal: Negative for back pain and neck pain.    Skin: Negative for rash.   Allergic/Immunologic: Negative for immunocompromised state. Neurological: Negative for dizziness, speech difficulty, weakness and headaches. Hematological: Negative for adenopathy. Psychiatric/Behavioral: Negative for dysphoric mood and suicidal ideas. Physical Exam   Physical Exam  Vitals and nursing note reviewed. Constitutional:       General: She is not in acute distress. Appearance: She is not ill-appearing or toxic-appearing. HENT:      Head: Normocephalic and atraumatic. Nose: Nose normal.      Mouth/Throat:      Mouth: Mucous membranes are moist.   Eyes:      Extraocular Movements: Extraocular movements intact. Pupils: Pupils are equal, round, and reactive to light. Cardiovascular:      Rate and Rhythm: Normal rate and regular rhythm. Pulses: Normal pulses. Pulmonary:      Effort: Pulmonary effort is normal. Tachypnea present. Breath sounds: No stridor. No wheezing or rhonchi. Abdominal:      General: Abdomen is flat. There is no distension. Tenderness: There is no abdominal tenderness. Musculoskeletal:         General: Normal range of motion. Cervical back: Normal range of motion and neck supple. Right lower leg: No tenderness. Edema present. Left lower leg: No tenderness. Edema present. Skin:     General: Skin is warm and dry. Neurological:      General: No focal deficit present. Mental Status: She is alert and oriented to person, place, and time.    Psychiatric:         Judgment: Judgment normal.         Diagnostic Study Results     Labs -     Recent Results (from the past 24 hour(s))   EKG, 12 LEAD, INITIAL    Collection Time: 03/14/22  2:46 PM   Result Value Ref Range    Ventricular Rate 71 BPM    Atrial Rate 64 BPM    QRS Duration 92 ms    Q-T Interval 430 ms    QTC Calculation (Bezet) 467 ms    Calculated R Axis -42 degrees    Calculated T Axis -66 degrees    Diagnosis       Atrial fibrillation with premature ventricular or aberrantly conducted   complexes  Left axis deviation  Anterolateral infarct , age undetermined  No previous ECGs available     CBC WITH AUTOMATED DIFF    Collection Time: 03/14/22  3:19 PM   Result Value Ref Range    WBC 7.4 3.6 - 11.0 K/uL    RBC 4.13 3.80 - 5.20 M/uL    HGB 13.0 11.5 - 16.0 g/dL    HCT 41.5 35.0 - 47.0 %    .5 (H) 80.0 - 99.0 FL    MCH 31.5 26.0 - 34.0 PG    MCHC 31.3 30.0 - 36.5 g/dL    RDW 12.7 11.5 - 14.5 %    PLATELET 511 096 - 974 K/uL    MPV 10.2 8.9 - 12.9 FL    NRBC 0.0 0  WBC    ABSOLUTE NRBC 0.00 0.00 - 0.01 K/uL    NEUTROPHILS 84 (H) 32 - 75 %    LYMPHOCYTES 9 (L) 12 - 49 %    MONOCYTES 7 5 - 13 %    EOSINOPHILS 0 0 - 7 %    BASOPHILS 0 0 - 1 %    IMMATURE GRANULOCYTES 0 0.0 - 0.5 %    ABS. NEUTROPHILS 6.2 1.8 - 8.0 K/UL    ABS. LYMPHOCYTES 0.7 (L) 0.8 - 3.5 K/UL    ABS. MONOCYTES 0.5 0.0 - 1.0 K/UL    ABS. EOSINOPHILS 0.0 0.0 - 0.4 K/UL    ABS. BASOPHILS 0.0 0.0 - 0.1 K/UL    ABS. IMM. GRANS. 0.0 0.00 - 0.04 K/UL    DF SMEAR SCANNED      RBC COMMENTS NORMOCYTIC, NORMOCHROMIC      WBC COMMENTS ATYPICAL LYMPHOCYTES PRESENT     METABOLIC PANEL, COMPREHENSIVE    Collection Time: 03/14/22  3:19 PM   Result Value Ref Range    Sodium 132 (L) 136 - 145 mmol/L    Potassium 4.5 3.5 - 5.1 mmol/L    Chloride 97 97 - 108 mmol/L    CO2 32 21 - 32 mmol/L    Anion gap 3 (L) 5 - 15 mmol/L    Glucose 129 (H) 65 - 100 mg/dL    BUN 31 (H) 6 - 20 MG/DL    Creatinine 0.58 0.55 - 1.02 MG/DL    BUN/Creatinine ratio 53 (H) 12 - 20      GFR est AA >60 >60 ml/min/1.73m2    GFR est non-AA >60 >60 ml/min/1.73m2    Calcium 8.8 8.5 - 10.1 MG/DL    Bilirubin, total 0.7 0.2 - 1.0 MG/DL    ALT (SGPT) 24 12 - 78 U/L    AST (SGOT) 26 15 - 37 U/L    Alk.  phosphatase 41 (L) 45 - 117 U/L    Protein, total 6.8 6.4 - 8.2 g/dL    Albumin 3.8 3.5 - 5.0 g/dL    Globulin 3.0 2.0 - 4.0 g/dL    A-G Ratio 1.3 1.1 - 2.2     TROPONIN-HIGH SENSITIVITY    Collection Time: 03/14/22  3:19 PM   Result Value Ref Range    Troponin-High Sensitivity 65 (HH) 0 - 51 ng/L   NT-PRO BNP    Collection Time: 03/14/22  3:19 PM   Result Value Ref Range    NT pro-BNP 9,117 (H) <450 PG/ML   LIPASE    Collection Time: 03/14/22  3:19 PM   Result Value Ref Range    Lipase 64 (L) 73 - 393 U/L       Radiologic Studies -   XR CHEST PA LAT   Final Result   Mild pulmonary edema pattern. Redemonstrated large hiatal hernia. CT Results  (Last 48 hours)    None        CXR Results  (Last 48 hours)               03/14/22 1619  XR CHEST PA LAT Final result    Impression:  Mild pulmonary edema pattern. Redemonstrated large hiatal hernia. Narrative:  EXAM: XR CHEST PA LAT       INDICATION: Shortness of breath       COMPARISON: Chest radiographs 10/15/2019, esophagram study 11/27/2020        TECHNIQUE: PA and lateral chest views       FINDINGS:   Cardiac mediastinal silhouette is stably enlarged. Redemonstrated large hiatal   hernia. Mild pulmonary edema pattern. No definite acute airspace disease. Pleural spaces are grossly clear. Chronic deformities and fragmentation of the   bilateral proximal humeri                  Medical Decision Making   I, Sami Lizarraga MD am the first provider for this patient, and I am the attending of record for this patient encounter. I reviewed the vital signs, available nursing notes, past medical history, past surgical history, family history and social history. Vital Signs-Reviewed the patient's vital signs. Patient Vitals for the past 24 hrs:   Temp Pulse Resp BP SpO2   03/14/22 2000 97.7 °F (36.5 °C) 70 20 131/74    03/14/22 1905  74 28 (!) 131/98      EKG interpretation: (Preliminary)  A. fib with occasional PVCs, left axis deviation, nonspecific ST changes. Normal QTC.  EKG interpretation by Sami Lizarraga MD    Records Reviewed: Nursing Notes and Old Medical Records    Provider Notes (Medical Decision Making):   DDX: CHF exacerbation/pulmonary edema, hiatal hernia/GERD/gastritis, atypical presentation of ACS/NSTEMI. Will check EKG, labs, CXR. Work-up in ED is remarkable for elevated troponin of 65, proBNP also elevated at 9117. Chest x-ray show pulmonary edema along with large hiatal hernia. Will medicate patient with 162 mg of ASA, 40 mg of IV Lasix (she has already taken her morning dose of furosemide at home), along with 40 mg of IV Protonix. Anticipate admission for further cardiac work-up, and ongoing diuresis. ED Course as of 03/14/22 Victorino Lomax Mar 14, 2022   [id number] Patient's case was discussed with Dr. Emmy Chowdary, who has accepted her for admission. [JM]      ED Course User Index  [JM] Joshua Ingram MD     CRITICAL CARE NOTE:    Authorized and Performed by: Kayden Mike MD    IMPENDING DETERIORATION -Airway, Respiratory, Cardiovascular and Metabolic  ASSOCIATED RISK FACTORS - Hypotension, Shock, Hypoxia, Metabolic changes and Vascular Compromise  MANAGEMENT- Bedside Assessment  INTERPRETATION -  Xrays, ECG, Blood Pressure, Cardiac Output Measures, pulse ox, and all labs  INTERVENTIONS - hemodynamic mngmt, vascular control and Metobolic interventions  CASE REVIEW - Hospitalist/Intensivist, Nursing and Family  TREATMENT RESPONSE -Stable  PERFORMED BY - Self    I have spent 75 minutes of critical care time involved in obtaining a history, examining the patient, lab review, arranging urgent treatment with development of a management plan, consultations with other providers, family decision- making, bedside attention and documentation. During this entire length of time I was immediately available to the patient . Critical Care: The reason for providing this level of medical care for this critically ill patient was due to a critical illness that impaired one or more vital organ systems, such that there was a high probability of imminent or life threatening deterioration in the patient's condition.  This care involved high complexity decision making to assess, manipulate, and support vital system functions, to treat this degree of vital organ system failure, and to prevent further life threatening deterioration of the patients condition. Critical care time was exclusive of separately billable procedures. ED Course:   Initial assessment performed. The patient's presenting problems have been discussed, and they are in agreement with the care plan formulated and outlined with them. I have encouraged them to ask questions as they arise throughout their visit. Lennie Lopez MD      Disposition:  Admit      Diagnosis     Clinical Impression:   1. Acute on chronic congestive heart failure, unspecified heart failure type (HCC)    2. Elevated troponin    3. Hiatal hernia    4. Abdominal pain, epigastric        Attestations:    Lennie Lopez MD    Please note that this dictation was completed with [employer], the computer voice recognition software. Quite often unanticipated grammatical, syntax, homophones, and other interpretive errors are inadvertently transcribed by the computer software. Please disregard these errors. Please excuse any errors that have escaped final proofreading. Thank you.

## 2022-03-14 NOTE — H&P
Hospitalist Admission Note    NAME: Chivo Garrido   :  1933   MRN:  [id number]     Date/Time:  3/14/2022 7:50 PM    Patient PCP: Tildon Angelucci, MD  ______________________________________________________________________  Given the patient's current clinical presentation, I have a high level of concern for decompensation if discharged from the emergency department. Complex decision making was performed, which includes reviewing the patient's available past medical records, laboratory results, and x-ray films. My assessment of this patient's clinical condition and my plan of care is as follows. Assessment / Plan:    CHF exacerbation  Elevated troponin  Chest x-rayMild pulmonary edema pattern. Redemonstrated large hiatal hernia. Troponin65 and repeat 65 too. BNP 9117. Patient got Lasix 40 mg IV in the ER, started on 40 mg IV twice daily. At home patient takes 20 mg daily. No previous record of echo. Echo ordered. Cardiology consulted. Patient is set up for stress test on 3/31. Patient cardiologist is Dr. Santi Schumacher and output, daily weights. Large hiatal hernia  Epigastric pain  Increase Protonix to 40 mg twice daily. [facility]wcrow consulted. History of hypertension  History of A. fib  GERD  Consulted pharmacy for med reconciliation. Code Status: Full code  Surrogate Decision Maker: Nephew    DVT Prophylaxis: Lovenox  GI Prophylaxis: not indicated    Baseline: Ambulatory at home      Subjective:   CHIEF COMPLAINT: Epigastric pain, shortness of breath    HISTORY OF PRESENT ILLNESS:     Kimi Hall is a 80 y.o.  female who presents with epigastric pain and shortness of breath. Patient past medical history of CHF, hiatal hernia, hypertension, A. fib. Patient states that she is having shortness of breath over the last few months and is following up with Dr. Shay Cabezas who has set up for the stress test on 3/31.   Patient also complains of epigastric pain which started today, has this pain before also and has large hiatal hernia but patient never wants to get operated considering the comorbidities and the risk. Denies any nausea vomiting, no chest pain, no headache, no dizziness, no fever and chills, no cough, no other complaints. We were asked to admit for work up and evaluation of the above problems. Past Medical History:   Diagnosis Date    Asthma     Hiatal hernia         Past Surgical History:   Procedure Laterality Date    HX GI      \"Multiple abdominal surgeries. \"    HX HIP REPLACEMENT      HX OTHER SURGICAL      \"Brain Surgery\". Social History     Tobacco Use    Smoking status: Never Smoker    Smokeless tobacco: Never Used   Substance Use Topics    Alcohol use: Not Currently        No family history on file. No significant family history  Allergies   Allergen Reactions    Dilantin [Phenytoin Sodium Extended] Rash    Penicillin G Rash        Prior to Admission medications    Medication Sig Start Date End Date Taking? Authorizing Provider   polyethylene glycol (MIRALAX) 17 gram/dose powder Take 17 g by mouth two (2) times a day. 1 tablespoon with 8 oz of water daily  Indications: constipation 10/15/19   Sona Lopez MD   levoFLOXacin (LEVAQUIN) 500 mg tablet Take 1 Tab by mouth daily. 10/15/19   Sona Lopez MD   predniSONE (STERAPRED DS) 10 mg dose pack Take as directed on packaging 10/15/19   Sona Lopez MD   albuterol (PROVENTIL HFA, VENTOLIN HFA, PROAIR HFA) 90 mcg/actuation inhaler Take 2 Puffs by inhalation every four (4) hours as needed for Wheezing. 10/15/19   Sona Lopez MD       REVIEW OF SYSTEMS:     I am not able to complete the review of systems because:    The patient is intubated and sedated    The patient has altered mental status due to his acute medical problems    The patient has baseline aphasia from prior stroke(s)    The patient has baseline dementia and is not reliable historian    The patient is in acute medical distress and unable to provide information           Total of 12 systems reviewed as follows:       POSITIVE= underlined text  Negative = text not underlined  General:  fever, chills, sweats, generalized weakness, weight loss/gain,      loss of appetite   Eyes:    blurred vision, eye pain, loss of vision, double vision  ENT:    rhinorrhea, pharyngitis   Respiratory:   cough, sputum production, SOB, MARKS, wheezing, pleuritic pain   Cardiology:   chest pain, palpitations, orthopnea, PND, edema, syncope   Gastrointestinal:  abdominal pain , N/V, diarrhea, dysphagia, constipation, bleeding   Genitourinary:  frequency, urgency, dysuria, hematuria, incontinence   Muskuloskeletal :  arthralgia, myalgia, back pain  Hematology:  easy bruising, nose or gum bleeding, lymphadenopathy   Dermatological: rash, ulceration, pruritis, color change / jaundice  Endocrine:   hot flashes or polydipsia   Neurological:  headache, dizziness, confusion, focal weakness, paresthesia,     Speech difficulties, memory loss, gait difficulty  Psychological: Feelings of anxiety, depression, agitation    Objective:   VITALS:    Visit Vitals  BP (!) 131/98   Pulse 74   Temp 97.6 °F (36.4 °C)   Resp 28   Ht 5' (1.524 m)   Wt 77.1 kg (170 lb)   SpO2 98%   BMI 33.20 kg/m²       PHYSICAL EXAM:    General:    Alert, cooperative, no distress, appears stated age. HEENT: Atraumatic, anicteric sclerae, pink conjunctivae     No oral ulcers, mucosa moist, throat clear, dentition fair  Neck:  Supple, symmetrical,  thyroid: non tender  Lungs:   Clear to auscultation bilaterally. No Wheezing or Rhonchi. No rales. Chest wall:  No tenderness  No Accessory muscle use. Heart:   Regular  rhythm,  No  murmur   1+ edema bilateral lower extremities  Abdomen:   Soft, non-tender. Not distended. Bowel sounds normal  Extremities: No cyanosis.   No clubbing,      Skin turgor normal, Capillary refill normal, Radial dial pulse 2+  Skin:     Not pale.  Not Jaundiced  No rashes   Psych:  Good insight. Not depressed. Not anxious or agitated. Neurologic: EOMs intact. No facial asymmetry. No aphasia or slurred speech. Symmetrical strength, Sensation grossly intact. Alert and oriented X 4.     _______________________________________________________________________  Care Plan discussed with:    Comments   Patient x    Family  x nephew   RN x    Care Manager                    Consultant:  x    _______________________________________________________________________  Expected  Disposition:   Home with Family    HH/PT/OT/RN x   SNF/LTC    DONA    ________________________________________________________________________  TOTAL TIME:  61 Minutes    Critical Care Provided     Minutes non procedure based      Comments     Reviewed previous records   >50% of visit spent in counseling and coordination of care  Discussion with patient and/or family and questions answered       ________________________________________________________________________  Signed: Rebeka Briones MD    Procedures: see electronic medical records for all procedures/Xrays and details which were not copied into this note but were reviewed prior to creation of Plan.     LAB DATA REVIEWED:    Recent Results (from the past 24 hour(s))   EKG, 12 LEAD, INITIAL    Collection Time: 03/14/22  2:46 PM   Result Value Ref Range    Ventricular Rate 71 BPM    Atrial Rate 64 BPM    QRS Duration 92 ms    Q-T Interval 430 ms    QTC Calculation (Bezet) 467 ms    Calculated R Axis -42 degrees    Calculated T Axis -66 degrees    Diagnosis       Atrial fibrillation with premature ventricular or aberrantly conducted   complexes  Left axis deviation  Anterolateral infarct , age undetermined  No previous ECGs available     CBC WITH AUTOMATED DIFF    Collection Time: 03/14/22  3:19 PM   Result Value Ref Range    WBC 7.4 3.6 - 11.0 K/uL    RBC 4.13 3.80 - 5.20 M/uL    HGB 13.0 11.5 - 16.0 g/dL    HCT 41.5 35.0 - 47.0 % .5 (H) 80.0 - 99.0 FL    MCH 31.5 26.0 - 34.0 PG    MCHC 31.3 30.0 - 36.5 g/dL    RDW 12.7 11.5 - 14.5 %    PLATELET 963 253 - 941 K/uL    MPV 10.2 8.9 - 12.9 FL    NRBC 0.0 0  WBC    ABSOLUTE NRBC 0.00 0.00 - 0.01 K/uL    NEUTROPHILS 84 (H) 32 - 75 %    LYMPHOCYTES 9 (L) 12 - 49 %    MONOCYTES 7 5 - 13 %    EOSINOPHILS 0 0 - 7 %    BASOPHILS 0 0 - 1 %    IMMATURE GRANULOCYTES 0 0.0 - 0.5 %    ABS. NEUTROPHILS 6.2 1.8 - 8.0 K/UL    ABS. LYMPHOCYTES 0.7 (L) 0.8 - 3.5 K/UL    ABS. MONOCYTES 0.5 0.0 - 1.0 K/UL    ABS. EOSINOPHILS 0.0 0.0 - 0.4 K/UL    ABS. BASOPHILS 0.0 0.0 - 0.1 K/UL    ABS. IMM. GRANS. 0.0 0.00 - 0.04 K/UL    DF SMEAR SCANNED      RBC COMMENTS NORMOCYTIC, NORMOCHROMIC      WBC COMMENTS ATYPICAL LYMPHOCYTES PRESENT     METABOLIC PANEL, COMPREHENSIVE    Collection Time: 03/14/22  3:19 PM   Result Value Ref Range    Sodium 132 (L) 136 - 145 mmol/L    Potassium 4.5 3.5 - 5.1 mmol/L    Chloride 97 97 - 108 mmol/L    CO2 32 21 - 32 mmol/L    Anion gap 3 (L) 5 - 15 mmol/L    Glucose 129 (H) 65 - 100 mg/dL    BUN 31 (H) 6 - 20 MG/DL    Creatinine 0.58 0.55 - 1.02 MG/DL    BUN/Creatinine ratio 53 (H) 12 - 20      GFR est AA >60 >60 ml/min/1.73m2    GFR est non-AA >60 >60 ml/min/1.73m2    Calcium 8.8 8.5 - 10.1 MG/DL    Bilirubin, total 0.7 0.2 - 1.0 MG/DL    ALT (SGPT) 24 12 - 78 U/L    AST (SGOT) 26 15 - 37 U/L    Alk.  phosphatase 41 (L) 45 - 117 U/L    Protein, total 6.8 6.4 - 8.2 g/dL    Albumin 3.8 3.5 - 5.0 g/dL    Globulin 3.0 2.0 - 4.0 g/dL    A-G Ratio 1.3 1.1 - 2.2     TROPONIN-HIGH SENSITIVITY    Collection Time: 03/14/22  3:19 PM   Result Value Ref Range    Troponin-High Sensitivity 65 (HH) 0 - 51 ng/L   NT-PRO BNP    Collection Time: 03/14/22  3:19 PM   Result Value Ref Range    NT pro-BNP 9,117 (H) <450 PG/ML   LIPASE    Collection Time: 03/14/22  3:19 PM   Result Value Ref Range    Lipase 64 (L) 73 - 393 U/L   TROPONIN-HIGH SENSITIVITY    Collection Time: 03/14/22  6:37 PM   Result Value Ref Range    Troponin-High Sensitivity 65 (HH) 0 - 51 ng/L

## 2022-03-14 NOTE — ED NOTES
Report given to [facility] and discussed patient chart.   Transport arranged to move patient to room 2769

## 2022-03-15 ENCOUNTER — APPOINTMENT (OUTPATIENT)
Dept: NON INVASIVE DIAGNOSTICS | Age: 87
DRG: 291 | End: 2022-03-15
Attending: STUDENT IN AN ORGANIZED HEALTH CARE EDUCATION/TRAINING PROGRAM
Payer: MEDICARE

## 2022-03-15 PROBLEM — R10.9 ABDOMINAL PAIN: Status: ACTIVE | Noted: 2022-03-15

## 2022-03-15 PROBLEM — E78.5 HLD (HYPERLIPIDEMIA): Status: ACTIVE | Noted: 2022-03-15

## 2022-03-15 PROBLEM — I10 HTN (HYPERTENSION): Status: ACTIVE | Noted: 2022-03-15

## 2022-03-15 PROBLEM — R06.02 SHORTNESS OF BREATH: Status: ACTIVE | Noted: 2022-03-15

## 2022-03-15 LAB
ANION GAP SERPL CALC-SCNC: 3 MMOL/L (ref 5–15)
ATRIAL RATE: 64 BPM
BUN SERPL-MCNC: 26 MG/DL (ref 6–20)
BUN/CREAT SERPL: 40 (ref 12–20)
CALCIUM SERPL-MCNC: 8.5 MG/DL (ref 8.5–10.1)
CALCULATED R AXIS, ECG10: -42 DEGREES
CALCULATED T AXIS, ECG11: -66 DEGREES
CHLORIDE SERPL-SCNC: 97 MMOL/L (ref 97–108)
CO2 SERPL-SCNC: 36 MMOL/L (ref 21–32)
CREAT SERPL-MCNC: 0.65 MG/DL (ref 0.55–1.02)
DIAGNOSIS, 93000: NORMAL
ERYTHROCYTE [DISTWIDTH] IN BLOOD BY AUTOMATED COUNT: 12.7 % (ref 11.5–14.5)
GLUCOSE SERPL-MCNC: 91 MG/DL (ref 65–100)
HCT VFR BLD AUTO: 39.8 % (ref 35–47)
HGB BLD-MCNC: 12.7 G/DL (ref 11.5–16)
MAGNESIUM SERPL-MCNC: 2.1 MG/DL (ref 1.6–2.4)
MCH RBC QN AUTO: 31.9 PG (ref 26–34)
MCHC RBC AUTO-ENTMCNC: 31.9 G/DL (ref 30–36.5)
MCV RBC AUTO: 100 FL (ref 80–99)
NRBC # BLD: 0 K/UL (ref 0–0.01)
NRBC BLD-RTO: 0 PER 100 WBC
PLATELET # BLD AUTO: 181 K/UL (ref 150–400)
PMV BLD AUTO: 9.7 FL (ref 8.9–12.9)
POTASSIUM SERPL-SCNC: 4.1 MMOL/L (ref 3.5–5.1)
Q-T INTERVAL, ECG07: 430 MS
QRS DURATION, ECG06: 92 MS
QTC CALCULATION (BEZET), ECG08: 467 MS
RBC # BLD AUTO: 3.98 M/UL (ref 3.8–5.2)
SODIUM SERPL-SCNC: 136 MMOL/L (ref 136–145)
VENTRICULAR RATE, ECG03: 71 BPM
WBC # BLD AUTO: 7.1 K/UL (ref 3.6–11)

## 2022-03-15 PROCEDURE — 74011000250 HC RX REV CODE- 250: Performed by: STUDENT IN AN ORGANIZED HEALTH CARE EDUCATION/TRAINING PROGRAM

## 2022-03-15 PROCEDURE — 74011250637 HC RX REV CODE- 250/637: Performed by: NURSE PRACTITIONER

## 2022-03-15 PROCEDURE — 99223 1ST HOSP IP/OBS HIGH 75: CPT | Performed by: INTERNAL MEDICINE

## 2022-03-15 PROCEDURE — 94760 N-INVAS EAR/PLS OXIMETRY 1: CPT

## 2022-03-15 PROCEDURE — 85027 COMPLETE CBC AUTOMATED: CPT

## 2022-03-15 PROCEDURE — 65660000000 HC RM CCU STEPDOWN

## 2022-03-15 PROCEDURE — 83735 ASSAY OF MAGNESIUM: CPT

## 2022-03-15 PROCEDURE — 77010033678 HC OXYGEN DAILY

## 2022-03-15 PROCEDURE — 74011250637 HC RX REV CODE- 250/637: Performed by: STUDENT IN AN ORGANIZED HEALTH CARE EDUCATION/TRAINING PROGRAM

## 2022-03-15 PROCEDURE — 80048 BASIC METABOLIC PNL TOTAL CA: CPT

## 2022-03-15 PROCEDURE — 36415 COLL VENOUS BLD VENIPUNCTURE: CPT

## 2022-03-15 PROCEDURE — 74011250636 HC RX REV CODE- 250/636: Performed by: STUDENT IN AN ORGANIZED HEALTH CARE EDUCATION/TRAINING PROGRAM

## 2022-03-15 PROCEDURE — 93306 TTE W/DOPPLER COMPLETE: CPT

## 2022-03-15 RX ADMIN — FUROSEMIDE 40 MG: 10 INJECTION, SOLUTION INTRAMUSCULAR; INTRAVENOUS at 17:48

## 2022-03-15 RX ADMIN — APIXABAN 5 MG: 5 TABLET, FILM COATED ORAL at 17:48

## 2022-03-15 RX ADMIN — PANTOPRAZOLE SODIUM 40 MG: 40 TABLET, DELAYED RELEASE ORAL at 16:24

## 2022-03-15 RX ADMIN — SODIUM CHLORIDE, PRESERVATIVE FREE 10 ML: 5 INJECTION INTRAVENOUS at 22:29

## 2022-03-15 RX ADMIN — ENOXAPARIN SODIUM 40 MG: 40 INJECTION SUBCUTANEOUS at 09:25

## 2022-03-15 RX ADMIN — SODIUM CHLORIDE, PRESERVATIVE FREE 10 ML: 5 INJECTION INTRAVENOUS at 05:28

## 2022-03-15 RX ADMIN — PANTOPRAZOLE SODIUM 40 MG: 40 TABLET, DELAYED RELEASE ORAL at 06:47

## 2022-03-15 RX ADMIN — FUROSEMIDE 40 MG: 10 INJECTION, SOLUTION INTRAMUSCULAR; INTRAVENOUS at 09:25

## 2022-03-15 NOTE — CONSULTS
Gastroenterology Consult     Referring Physician: Dr. Mia Ellis Date: 3/15/2022     Subjective:     Chief Complaint: epigastric pain    History of Present Illness: Boris Rey is a 80 y.o. female who is seen in consultation for epigastric pain. She had epigastric pain for 3 hrs yesterday. It was a soreness or an ache. It was not severe. It did not radiate. No associated N/V. It has resolved and she is tolerating a regular diet. Takes a baby ASA but no other NSAIDs. She has a large hiatal hernia that was seen on UGI/Barium swallow in 2020. She was not a good surgical candidate at that time. She takes omeprazole 40mg BID and has no significant GERD sxs. She has chronic dysphagia which she has had for years and it has not progressed. She gets choked once a day on a samantha. It gets stuck in her throat and she has to drink water to get it down. As long as she chews carefully, she is able to tolerate a regular diet and get food down and is not losing any weight. She has had worsening SOB and LE edema. Pro BNP >9000 on admission with pulmonary edema on CXR and requiring O2. Troponin elevated. She is scheduled for a stress test at the end of the month with Dr. Kristan Avina. Cardiology, Dr. Dave Argueta, has been consulted. Past Medical History:   Diagnosis Date    Asthma     Hiatal hernia      Past Surgical History:   Procedure Laterality Date    HX GI      \"Multiple abdominal surgeries. \"    HX HIP REPLACEMENT      HX OTHER SURGICAL      \"Brain Surgery\". No family history on file.   Social History     Tobacco Use    Smoking status: Never Smoker    Smokeless tobacco: Never Used   Substance Use Topics    Alcohol use: Not Currently      Allergies   Allergen Reactions    Dilantin [Phenytoin Sodium Extended] Rash    Penicillin G Rash     Current Facility-Administered Medications   Medication Dose Route Frequency    acetaminophen (TYLENOL) tablet 650 mg  650 mg Oral Q6H PRN    ondansetron (ZOFRAN) injection 4 mg  4 mg IntraVENous Q4H PRN    furosemide (LASIX) injection 40 mg  40 mg IntraVENous BID    pantoprazole (PROTONIX) tablet 40 mg  40 mg Oral ACB&D    sodium chloride (NS) flush 5-40 mL  5-40 mL IntraVENous Q8H    sodium chloride (NS) flush 5-40 mL  5-40 mL IntraVENous PRN    polyethylene glycol (MIRALAX) packet 17 g  17 g Oral DAILY PRN    enoxaparin (LOVENOX) injection 40 mg  40 mg SubCUTAneous DAILY        Review of Systems:  A detailed review of systems was performed as follows:  Constitutional:  Negative  Eyes:  No ocular sensitivity to the sun, blurred vision or double vision. ENMT:  No nose or sinus problems. Respiratory: +SOB  Cardiac:  See HPI  Gastrointestinal:  See history of the present illness  :   No pain with urination or hematuria  Musculoskeletal:  No arthritis or hot swollen joints. Endocrine:  No thyroid disease or diabetes  Psychiatric: No depression or feeling blue  Integumentary:  +cellulitis RLE  Neurologic:  No stroke or seizure; no numbness or tingling of the extremities. Heme-Lymphatic:  No history of anemia, no unexplained lumps or bumps      Objective:     Physical Exam:  Visit Vitals  /68   Pulse (!) 52   Temp 98 °F (36.7 °C)   Resp 18   Ht 5' (1.524 m)   Wt 77.1 kg (170 lb)   SpO2 92%   BMI 33.20 kg/m²        Gen: 79 y/o white female sitting up in bed eating scrambled eggs and sausage in no acute distress, nephew at bedside  Skin:  Extremities and face reveal no rashes. HEENT: Sclerae anicteric. NC oxygen in place. No abnormal pigmentation of the lips. Cardiovascular: irregularly irregular. No murmurs appreciated  Respiratory:  Very diminished breath sounds in bases bilaterally  GI:  Abdomen nondistended, soft, and nontender. Normal active bowel sounds. No enlargement of the liver or spleen. No masses palpable.   Rectal:  Deferred  Musculoskeletal:  1+ pitting edema BLE, bandage on RLE  Neurological:  Gross memory appears intact. Patient is alert and oriented. Psychiatric:  Mood appears appropriate with judgement intact. Lymphatic:  No cervical or supraclavicular adenopathy. Lab/Data Review:  Recent Labs     03/15/22  0242 03/14/22  1519   WBC 7.1 7.4   HGB 12.7 13.0   HCT 39.8 41.5    200     Recent Labs     03/15/22  0242 03/14/22  1519    132*   K 4.1 4.5   CL 97 97   CO2 36* 32   BUN 26* 31*   CREA 0.65 0.58   GLU 91 129*   CA 8.5 8.8   MG 2.1  --      Recent Labs     03/14/22  1519   ALT 24   AP 41*   TBILI 0.7   TP 6.8   ALB 3.8   GLOB 3.0   LPSE 64*     No results for input(s): INR, PTP, APTT, INREXT in the last 72 hours. No results for input(s): FE, TIBC, PSAT, FERR in the last 72 hours. No results found for: FOL, RBCF   No results for input(s): PH, PCO2, PO2 in the last 72 hours. No results for input(s): CPK, CKNDX, TROIQ in the last 72 hours. No lab exists for component: CPKMB  No results found for: CHOL, CHOLX, CHLST, CHOLV, HDL, HDLP, LDL, LDLC, DLDLP, TGLX, TRIGL, TRIGP, CHHD, CHHDX  No results found for: GLUCPOC  Lab Results   Component Value Date/Time    Color DARK YELLOW 10/15/2019 09:29 PM    Appearance CLOUDY (A) 10/15/2019 09:29 PM    Specific gravity 1.028 10/15/2019 09:29 PM    pH (UA) 7.0 10/15/2019 09:29 PM    Protein 30 (A) 10/15/2019 09:29 PM    Glucose NEGATIVE  10/15/2019 09:29 PM    Ketone 15 (A) 10/15/2019 09:29 PM    Bilirubin NEGATIVE  10/15/2019 09:29 PM    Urobilinogen 1.0 10/15/2019 09:29 PM    Nitrites POSITIVE (A) 10/15/2019 09:29 PM    Leukocyte Esterase TRACE (A) 10/15/2019 09:29 PM    Epithelial cells MODERATE (A) 10/15/2019 09:29 PM    Bacteria 4+ (A) 10/15/2019 09:29 PM    WBC 5-10 10/15/2019 09:29 PM    RBC 0-5 10/15/2019 09:29 PM           Assessment/Plan: We are asked to see this 79 y/o female with an elevated troponin and CHF exacerbation for mild to moderate epigastric pain that she had for 3 hrs yesterday which has now resolved.   She has a known large hiatal hernia which has previously been evaluated and she was not felt to be a good surgical candidate 2 yrs ago so she has been managed medically with BID PPI. She has no breakthrough GERD sxs. She has chronic dysphagia that is non progressive. She is tolerating a regular diet and not losing weight. She has never had an EGD. I offered her an EGD (inpatient or outpatient after cardiac clearance) for empiric dilation to see if her dysphagia improves. I told her it may not improve because her dysphagia may not be due to a stricture. It good be due to esophageal dysmotility or the hernia itself. She will think it over and let us know what she wants to do. The abd pain does not require any further evaluation unless it returns. DANIEL Franco  03/15/22  11:19 AM    The patient was seen and examined independently by me. I have discussed the case with the mid-level provider in detail. I have reviewed the patient's chart and the note. I personally performed all components of the history, physical, and medical decision making and agree with the assessment and plan, with minor modifications as noted. Please see APPs note for full details. Cristiano BROOKS Nephew at bedside. Physical exam:  General:AAO x 3,   HEENT:  EOMI,   Chest:  CTA,Heart: S1, S2, RRR  GI: Soft, NT, ND + bowel sounds  Extremities: No edema    Data Review:  reviewed   BAS 11.20(and in Sept 20): The findings are stable with esophageal  abnormal peristalsis and large hiatal hernia including most of the stomach in  the chest, no definite evidence for obstruction. No stricture. Impression:   Epigastric pain  Hx of liv HH  CHF,  Elevated troponin    Plan and discussion:  · Ms. Pancho Du is an 51-year-old  lady with a past medical history of large hiatal hernia(most of the stomach herniates in it) admitted with chest/epigastric discomfort who is also known to have congestive heart failure.   She has an elevated troponin and cardiology is to see her. Previous non-invasive GI work-up on a barium swallow in November 2020 revealed a large hiatal hernia with decreased esophageal peristalsis. She has mild intermittent dysphagia which she resolves by drinking copius liquids. She is currently, and has been in the past, hesitant about getting an upper endoscopy. Quirino Alford, her nephew, was at her bedside when I examined and saw her. Options including medical therapy/observation, an upper endoscopy or  re-evaluation with a barium swallow was discussed with them in detail. · As she is being ruled out for cardiac reason for her symptoms, I suggest a barium swallow to be evaluate the size of the hiatal hernia. r/o stricture and for any possible volvulus/'twisting' in it. · Agree with supportive therapy including PPI for the time being. · Masticate food well before swallowing  · Cardiology consultation is in the planning. Signed By: Pam Sylvester.  Ramses Ford MD    3/15/2022  2:12 PM

## 2022-03-15 NOTE — PROGRESS NOTES
Hospitalist Progress Note    NAME: Carol Urena   :  1933   MRN:  [id number]       Assessment / Plan:  CHF exacerbation  Elevated troponin    Troponin65 and repeat 65 too. BNP 9117. Continue  40 mg IV twice daily. Gina Giles still pending  Cardiology consulted. Patient is set up for stress test on 3/31. Patient cardiologist is Dr. Le Padilla and output, daily weights.     Large hiatal hernia  Epigastric pain  continue Protonix to 40 mg twice daily. Marlo consulted.     History of hypertension  History of A. fib  GERD  Consulted pharmacy for med reconciliation.        30.0 - 39.9 Obese / Body mass index is 33.2 kg/m². Estimated discharge date:   Barriers:    Code status: Full  Prophylaxis: Lovenox  Recommended Disposition: Home w/Family     Subjective:     Chief Complaint / Reason for Physician Visit  \"\". Discussed with RN events overnight. On 2 L NC     Review of Systems:  Symptom Y/N Comments  Symptom Y/N Comments   Fever/Chills n   Chest Pain n    Poor Appetite    Edema     Cough    Abdominal Pain     Sputum    Joint Pain     SOB/MARKS y   Pruritis/Rash     Nausea/vomit    Tolerating PT/OT     Diarrhea    Tolerating Diet y    Constipation n   Other       Could NOT obtain due to:      Objective:     VITALS:   Last 24hrs VS reviewed since prior progress note.  Most recent are:  Patient Vitals for the past 24 hrs:   Temp Pulse Resp BP SpO2   03/15/22 1122 98.2 °F (36.8 °C) 61 18 (!) 111/56 94 %   03/15/22 0837 98 °F (36.7 °C) (!) 52 18 103/68 92 %   03/15/22 0324 98.1 °F (36.7 °C) 67 18 117/82 97 %   03/15/22 0323 98 °F (36.7 °C) 64 20 128/70 98 %   22 2000 97.7 °F (36.5 °C) 70 20 131/74 98 %   22 1905  74 28 (!) 131/98    22 1847    117/85    22 1832    (!) 114/93    22 1817    (!) 135/94    22 1802    (!) 124/102    22 1747    (!) 129/92    22 1732    (!) 117/105    22 1715    122/87  03/14/22 1702    118/88    03/14/22 1435 97.6 °F (36.4 °C) 70 18 (!) 110/92 98 %       Intake/Output Summary (Last 24 hours) at 3/15/2022 1218  Last data filed at 3/15/2022 1014  Gross per 24 hour   Intake    Output 550 ml   Net -550 ml        I had a face to face encounter and independently examined this patient on 3/15/2022, as outlined below:  PHYSICAL EXAM:  General: WD, WN. Alert, cooperative, no acute distress    EENT:  EOMI. Anicteric sclerae. MMM  Resp:  CTA bilaterally, no wheezing or rales. No accessory muscle use  CV:  Regular  rhythm,  No edema  GI:  Soft, Non distended, Non tender. +Bowel sounds  Neurologic:  Alert and oriented X 3, normal speech,   Psych:   Good insight. Not anxious nor agitated  Skin:  No rashes. No jaundice    Reviewed most current lab test results and cultures  YES  Reviewed most current radiology test results   YES  Review and summation of old records today    NO  Reviewed patient's current orders and MAR    YES  PMH/ reviewed - no change compared to H&P  ________________________________________________________________________  Care Plan discussed with:    Comments   Patient y    Family      RN y    Care Manager     Consultant                        Multidiciplinary team rounds were held today with , nursing, pharmacist and clinical coordinator. Patient's plan of care was discussed; medications were reviewed and discharge planning was addressed.      ________________________________________________________________________  Total NON critical care TIME:  35  Minutes    Total CRITICAL CARE TIME Spent:   Minutes non procedure based      Comments   >50% of visit spent in counseling and coordination of care y    ________________________________________________________________________  Enma Porter MD     Procedures: see electronic medical records for all procedures/Xrays and details which were not copied into this note but were reviewed prior to creation of Plan.      LABS:  I reviewed today's most current labs and imaging studies. Pertinent labs include:  Recent Labs     03/15/22  0242 03/14/22  1519   WBC 7.1 7.4   HGB 12.7 13.0   HCT 39.8 41.5    200     Recent Labs     03/15/22  0242 03/14/22  1519    132*   K 4.1 4.5   CL 97 97   CO2 36* 32   GLU 91 129*   BUN 26* 31*   CREA 0.65 0.58   CA 8.5 8.8   MG 2.1  --    ALB  --  3.8   TBILI  --  0.7   ALT  --  24       Signed:  Samantha Cisneros MD

## 2022-03-15 NOTE — PROGRESS NOTES
Transition of Care Plan:    RUR: 11% \"low risk\"  Disposition: Home with follow-up appts & caregivers  Follow up appointments: PCP & specialists  DME needed: TBD- has w/c & 3-in-1 commode; will need 02 challenge if unable to wean  Transportation at Discharge: Family/ caregiver  [place] or means to access home: Yes  IM Medicare Letter: Needed at d/c  Is patient a BCPI-A Bundle: No   If yes, was Bundle Letter given?:  N/A  Is patient a  and connected with the [place]? No              If yes, was [employer] transfer form completed and VA notified? N/A  Caregiver Contact: POA/ Nephew- [id number], [phone]  Discharge Caregiver contacted prior to discharge? Care Conference needed?:                   CM reviewed pt's chart. CM met with pt & her caregiver Becky Villarreal, [phone]) at bedside to introduce role & complete initial assessment. Pt confirmed demographic information is up to date. Lives with her nephew (caregiver) & full-brissa caregiver (Salima) in single level/ ramp entry home. Reports being mostly independent at baseline; mobile with a wheelchair & reports being able to transfer independently. Family uses a wheelchair [employer] for transportation. Uses a w/c & 3-in-1 commode; no other DME needs identified at this time. No prior HH reported; prior SNF at [facility]. No PT/OT evals at this time. Pt's family/ caregiver will transport pt home at discharge. Will continue to follow.     Reason for Admission:  CHF exacerbation                   RUR Score:  11%                   Plan for utilizing home health:  No PT/OT evals at this time        PCP: First and Last name:  Arely Zhong MD   Name of Practice: [address] [place] [address]   Are you a current patient: Yes/No: Yes   Approximate date of last visit: Sept 2021   Can you participate in a virtual visit with your PCP:                     Current Advanced Directive/Advance Care Plan: Full Code  [address]. Decision Maker:       Primary Decision Maker: Jw Welch - Other Relative - [phone]    Click here to complete [address] including selection of the Healthcare Decision Maker Relationship (ie \"Primary\")  Today we documented Decision Maker(s) consistent with ACP documents on file. Care Management Interventions  PCP Verified by CM: Yes  Mode of Transport at Discharge:  Other (see comment) (Carefiver/ family)  Transition of Care Consult (CM Consult): Discharge Planning  Discharge Durable Medical Equipment: No  Physical Therapy Consult: No  Occupational Therapy Consult: No  Speech Therapy Consult: No  Support Systems: Other Family Member(s),Caregiver/Home Care Staff (lives with nephcamila Spivey) & full-time caregiver)  Confirm Follow Up Transport: Family  The Plan for Transition of Care is Related to the Following Treatment Goals : Home with [facility] & follow-up appts  The Patient and/or Patient Representative was Provided with a Choice of Provider and Agrees with the Discharge Plan?: Yes  Name of the Patient Representative Who was Provided with a Choice of Provider and Agrees with the Discharge Plan: Nephew/ POA- [address] [facility]  Discharge Location  Patient Expects to be Discharged to[de-identified] [facility], MSW  Care Management

## 2022-03-15 NOTE — CONSULTS
[facility]     Date of  Admission: 3/14/2022  4:53 PM     Admission type:Emergency    Referral for: CHF   Referral by: Dr. Ophelia Matute:     Jami Urbano is an 80 y.o. female with a PMHx significant for Arthritis, Hiatal Hernia, PAF, HTN, Chronic edema/cellulitis admitted for CHF exacerbation ([facility] [address] [I50.9]  Elevated troponin [R77.8]  Hyponatremia [E87.1]. Per admitting provider, the patient presented with epigastric pain and shortness of breath. The patient reported several months of persistent SOB and saw a cardiologist recently who did schedule an outpatient stress test.     Upon assessment, the patient endorses the above. She reports she experienced the continued shortness of breath yesterday and some mid-abdominal epigastric pain which led her to seek care in the ED. She states the shortness of breath has been ongoing for at least a month. She reports she sought care from Dr. Christen Shultz (cardiology) who prescribed her a diuretic to help. The patient also states she has bilateral leg edema, right greater than left and a history of cellulitis with blisters. She reports she has a history of Afib which occurred after her hip surgery, which she states prolonged her stay in the hospital, but that the Afib terminated prior to discharge. She denies wearing home O2, denies any chest pain/tightness/discomfort/n/v/diarrhea/fever/chills, or recent Covid 19 infection. She also denies noticing any palpitations, dizziness, lightheadedness, or recent falls. She and her nephew state she ambulates very little; mostly transfers from bed to w/c, w/c to bathroom, w/c to chair,etc. She reports she did receive Covid vaccine, J and J, no booster reported. She reports medication compliance, not currently prescribed any cardiac medications. She denies history of bloody stools. She also denies smoking, ETOH use, or illicit drug use.      The patient and her nephew, who is her primary caregiver, state she established care with Cardiologist Dr. Sid Mooney in 2019 when she was seeking clearance for hip surgery. She reports at that time she had a negative stress test. EKG shows Afib with PVCs, rate controlled. Normal bilateral ESTEFANI's Jan 2022. Cardiac risk factors: family history, obesity, sedentary life style, hypertension, post-menopausal, age. Patient Active Problem List    Diagnosis Date Noted    HLD (hyperlipidemia) 03/15/2022    HTN (hypertension) 03/15/2022    Abdominal pain 03/15/2022    Shortness of breath 03/15/2022    Hyponatremia 03/14/2022    Elevated troponin 03/14/2022    CHF exacerbation ([address] 03/14/2022      Yuridia Redman MD  Past Medical History:   Diagnosis Date    Asthma     Hiatal hernia       Social History     Socioeconomic History    Marital status:    Tobacco Use    Smoking status: Never Smoker    Smokeless tobacco: Never Used   Substance and Sexual Activity    Alcohol use: Not Currently    Drug use: Never    Sexual activity: Not Currently     Allergies   Allergen Reactions    Dilantin [Phenytoin Sodium Extended] Rash    Penicillin G Rash      No family history on file.    Current Facility-Administered Medications   Medication Dose Route Frequency    acetaminophen (TYLENOL) tablet 650 mg  650 mg Oral Q6H PRN    ondansetron (ZOFRAN) injection 4 mg  4 mg IntraVENous Q4H PRN    furosemide (LASIX) injection 40 mg  40 mg IntraVENous BID    pantoprazole (PROTONIX) tablet 40 mg  40 mg Oral ACB&D    sodium chloride (NS) flush 5-40 mL  5-40 mL IntraVENous Q8H    sodium chloride (NS) flush 5-40 mL  5-40 mL IntraVENous PRN    polyethylene glycol (MIRALAX) packet 17 g  17 g Oral DAILY PRN    enoxaparin (LOVENOX) injection 40 mg  40 mg SubCUTAneous DAILY          Review of Symptoms:  Constitutional: negative  Eyes: negative  Ears, nose, mouth, throat, and face: negative  Respiratory: shortness of breath   Cardiovascular: leg edema Gastrointestinal: abdominal pain   Genitourinary:negative  Musculoskeletal:negative  Neurological: negative  Behvioral/Psych: negative  Endocrine: negative            Objective:      Visit Vitals  BP (!) 111/56   Pulse 61   Temp 98.2 °F (36.8 °C)   Resp 18   Ht 5' (1.524 m)   Wt 77.1 kg (170 lb)   SpO2 94%   BMI 33.20 kg/m²       Physical Assessment:   General Appearance:  alert, cooperative, well nourished, well developed; obese,  female, appears stated age, in NAD  Eyes: sclera anicteric  Mouth/Throat: moist mucous membranes; oral pharynx clear  Neck: supple; no JVD or bruit  Pulmonary:  rales to auscultation bilaterally; good effort  Cardiovascular: irregular rate and rhythm; no murmur, click, rub, or gallop  Abdomen: soft, non-tender, non-distended; bowel sounds normal  Musculoskeletal: no swelling or deformity; moves all extremities  Extremities: 2-3+ edema; palpable distal pulses   Skin: warm to touch bilateral legs, left leg wrapped, weeping, tender   Neuro: grossly normal, [place]   Psych: normal mood and affect given the setting      Data Review:   Recent Labs     03/15/22  0242 03/14/22  1519   WBC 7.1 7.4   HGB 12.7 13.0   HCT 39.8 41.5    200     Recent Labs     03/15/22  0242 03/14/22  1519    132*   K 4.1 4.5   CL 97 97   CO2 36* 32   GLU 91 129*   BUN 26* 31*   CREA 0.65 0.58   CA 8.5 8.8   MG 2.1  --    ALB  --  3.8   TBILI  --  0.7   ALT  --  24       Recent Labs     03/14/22  1837 03/14/22  1519   TROPHS 65* 65*         Intake/Output Summary (Last 24 hours) at 3/15/2022 1521  Last data filed at 3/15/2022 1232  Gross per 24 hour   Intake    Output 1050 ml   Net -1050 ml        Cardiographics    Telemetry: Afib frequent PVCs rate 70-80's   ECG: Afib   Echocardiogram: pending   CXRAY: \"Mild pulmonary edema pattern. Redemonstrated large hiatal hernia. \"       Assessment:       Principal Problem:    CHF exacerbation ([address] (3/14/2022)    Active Problems:    Hyponatremia (3/14/2022)      Elevated troponin (3/14/2022)      HLD (hyperlipidemia) (3/15/2022)      HTN (hypertension) (3/15/2022)      Abdominal pain (3/15/2022)      Shortness of breath (3/15/2022)         Plan:   Siria Titussin is an 80 y.o. female with a PMHx significant for Arthritis, Hiatal Hernia, PAF, HTN, Chronic edema/cellulitis admitted for CHF exacerbation ([facility] [address] [I50.9] Elevated troponin [R77.8] Hyponatremia [E87.1]. CHF exacerbation:  History of A. Fib: Currently Afib rate controlled   She is not on any rate controlling medications  NT pro-BNP 9k  CXray showed mild pulmonary edema   Patient received IV lasix 40 mg in ED  Continue Lasix 40 mg IV BID  Documented negative 1 L, Iikely incomplete;some episodes of urinary incontinence   Strict I/O, labs, daily weights  ECHO read pending  EKG shows rate controlled Afib with frequent PVCs  Please keep K at 4, Mg at 2  Atrial Fibrillation CHADSVASC2 Score Stroke Risk:   80 y.o. > 76        +2    female Female +1   CHF HX: Yes    +1   HTN HX: Yes    +1   Stroke/TIA/Thromboembolism No    +0   Vascular Disease HX: No    + 0   Diabetes Mellitus No    + 0   CHADSVASC 2 Score 5      Annual Stroke Risk 7.2% - moderate-high      We discussed possible DEMETRIS and cardioversion with the patient  We also discussed long term anticoagulation with Eliquis. If not contraindicated with GI, would like to start full dose lovenox -she denies history of GIB. Elevated troponin:  Patient reports no ACS symptoms  Troponin flat, non-specific, 65 x 2. ECHO pending   Monitor telemetry   Patient is set up for stress test on 3/31. Patient cardiologist is Dr. Christina Lord hiatal hernia  Epigastric pain  Increased Protonix to 40 mg twice daily.   GI consulted, noted plans for modified barium swallow in am      History of hypertension:  Controlled  Not on any anti-hypertensive's  Continue lasix as prescribed     Thank you for allowing us to partner in the care od this patient, will follow. Olga Martinez NP   DNP,APRN,AG-ACNP-BC    Patient seen and examined by me with the above nurse practitioner. I personally performed all components of the history, physical, and medical decision making and agree with the assessment and plan with minor modifications as noted. Today the patient presents with mild volume overload due to HF (unknown if systolic or diastolic), presumed new AF, and hiatal hernia with abdominal pain. General PE  Gen:  NAD  Mental Status - Alert. General Appearance - Not in acute distress. HEENT:  PERRL, no carotid bruits or JVD  Chest and Lung Exam   Inspection: Accessory muscles - No use of accessory muscles in breathing. Auscultation:   Breath sounds: - decreased at bases  Cardiovascular   Inspection: Jugular vein - Bilateral - Inspection Normal.   Palpation/Percussion:   Apical Impulse: - Normal.   Auscultation: Rhythm - irregular. Heart Sounds - S1 WNL and S2 WNL. No S3 or S4. Murmurs & Other Heart Sounds: Auscultation of the heart reveals - No Murmurs. Peripheral Vascular   Upper Extremity: Inspection - Bilateral - No Cyanotic nailbeds or Digital clubbing. Lower Extremity:   Palpation: Edema - Bilateral - 1+ edema. Abdomen:   Soft, non-tender, bowel sounds are active. Neuro: A&O times 3, CN and motor grossly WNL    Continue diuresis. Echo pending. Start Eliquis (discussed with GI). Discussed risks and benefits of anticoagulation, signs and symptoms of bleeding, and to call if any concerns. Would favor rate control and AC with large hiatal hernia that may make DEMETRIS more difficult, consider CV in 4-6 weeks. Thanks for the consult. We appreciate the opportunity to participate in this patient's care.

## 2022-03-16 ENCOUNTER — HOSPITAL ENCOUNTER (INPATIENT)
Dept: GENERAL RADIOLOGY | Age: 87
Discharge: HOME | DRG: 291 | End: 2022-03-16
Attending: INTERNAL MEDICINE
Payer: MEDICARE

## 2022-03-16 LAB
ECHO AO ASC DIAM: 3.2 CM
ECHO AO ASCENDING AORTA INDEX: 1.84 CM/M2
ECHO AV AREA PEAK VELOCITY: 2.1 CM2
ECHO AV AREA VTI: 2.1 CM2
ECHO AV AREA/BSA PEAK VELOCITY: 1.2 CM2/M2
ECHO AV AREA/BSA VTI: 1.2 CM2/M2
ECHO AV MEAN GRADIENT: 4 MMHG
ECHO AV MEAN VELOCITY: 0.9 M/S
ECHO AV PEAK GRADIENT: 9 MMHG
ECHO AV PEAK VELOCITY: 1.5 M/S
ECHO AV VELOCITY RATIO: 0.53
ECHO AV VTI: 22.8 CM
ECHO LA DIAMETER INDEX: 2.36 CM/M2
ECHO LA DIAMETER: 4.1 CM
ECHO LV E' LATERAL VELOCITY: 7 CM/S
ECHO LV E' SEPTAL VELOCITY: 8 CM/S
ECHO LV EDV A4C: 197 ML
ECHO LV EDV INDEX A4C: 113 ML/M2
ECHO LV EJECTION FRACTION A4C: 21 %
ECHO LV ESV A4C: 155 ML
ECHO LV ESV INDEX A4C: 89 ML/M2
ECHO LV FRACTIONAL SHORTENING: 4 % (ref 28–44)
ECHO LV INTERNAL DIMENSION DIASTOLE INDEX: 3.22 CM/M2
ECHO LV INTERNAL DIMENSION DIASTOLIC: 5.6 CM (ref 3.9–5.3)
ECHO LV INTERNAL DIMENSION SYSTOLIC INDEX: 3.1 CM/M2
ECHO LV INTERNAL DIMENSION SYSTOLIC: 5.4 CM
ECHO LV IVSD: 1 CM (ref 0.6–0.9)
ECHO LV MASS 2D: 234.3 G (ref 67–162)
ECHO LV MASS INDEX 2D: 134.7 G/M2 (ref 43–95)
ECHO LV POSTERIOR WALL DIASTOLIC: 1.1 CM (ref 0.6–0.9)
ECHO LV RELATIVE WALL THICKNESS RATIO: 0.39
ECHO LVOT AREA: 3.8 CM2
ECHO LVOT AV VTI INDEX: 0.55
ECHO LVOT DIAM: 2.2 CM
ECHO LVOT MEAN GRADIENT: 2 MMHG
ECHO LVOT PEAK GRADIENT: 3 MMHG
ECHO LVOT PEAK VELOCITY: 0.8 M/S
ECHO LVOT STROKE VOLUME INDEX: 27.3 ML/M2
ECHO LVOT SV: 47.5 ML
ECHO LVOT VTI: 12.5 CM
ECHO MV AREA VTI: 1.8 CM2
ECHO MV EROA CONT EQ: 0.4 CM2
ECHO MV LVOT VTI INDEX: 2.08
ECHO MV MAX VELOCITY: 0.9 M/S
ECHO MV MEAN GRADIENT: 2 MMHG
ECHO MV MEAN VELOCITY: 0.7 M/S
ECHO MV PEAK GRADIENT: 3 MMHG
ECHO MV REGURGITANT ALIASING (NYQUIST) VELOCITY: 27 CM/S
ECHO MV REGURGITANT VELOCITY PISA: 4.6 M/S
ECHO MV REGURGITANT VTIA: 140.8 CM
ECHO MV VTI: 26 CM
ECHO PULMONARY ARTERY END DIASTOLIC PRESSURE: 6 MMHG
ECHO PV MAX VELOCITY: 0.7 M/S
ECHO PV PEAK GRADIENT: 2 MMHG
ECHO PV REGURGITANT MAX VELOCITY: 1.2 M/S
ECHO RV INTERNAL DIMENSION: 4.9 CM
ECHO TV REGURGITANT MAX VELOCITY: 2.33 M/S
ECHO TV REGURGITANT PEAK GRADIENT: 22 MMHG

## 2022-03-16 PROCEDURE — 74011250637 HC RX REV CODE- 250/637: Performed by: STUDENT IN AN ORGANIZED HEALTH CARE EDUCATION/TRAINING PROGRAM

## 2022-03-16 PROCEDURE — 74011250637 HC RX REV CODE- 250/637: Performed by: NURSE PRACTITIONER

## 2022-03-16 PROCEDURE — 77010033678 HC OXYGEN DAILY

## 2022-03-16 PROCEDURE — 74011000250 HC RX REV CODE- 250: Performed by: STUDENT IN AN ORGANIZED HEALTH CARE EDUCATION/TRAINING PROGRAM

## 2022-03-16 PROCEDURE — 93306 TTE W/DOPPLER COMPLETE: CPT | Performed by: INTERNAL MEDICINE

## 2022-03-16 PROCEDURE — 74220 X-RAY XM ESOPHAGUS 1CNTRST: CPT

## 2022-03-16 PROCEDURE — 99233 SBSQ HOSP IP/OBS HIGH 50: CPT | Performed by: INTERNAL MEDICINE

## 2022-03-16 PROCEDURE — 74011250636 HC RX REV CODE- 250/636: Performed by: STUDENT IN AN ORGANIZED HEALTH CARE EDUCATION/TRAINING PROGRAM

## 2022-03-16 PROCEDURE — 94760 N-INVAS EAR/PLS OXIMETRY 1: CPT

## 2022-03-16 PROCEDURE — 65660000000 HC RM CCU STEPDOWN

## 2022-03-16 PROCEDURE — 92610 EVALUATE SWALLOWING FUNCTION: CPT

## 2022-03-16 RX ORDER — CARVEDILOL 3.12 MG/1
3.12 TABLET ORAL 2 TIMES DAILY WITH MEALS
Status: DISCONTINUED | OUTPATIENT
Start: 2022-03-16 | End: 2022-03-18 | Stop reason: HOSPADM

## 2022-03-16 RX ORDER — METOPROLOL SUCCINATE 25 MG/1
12.5 TABLET, EXTENDED RELEASE ORAL DAILY
Status: DISCONTINUED | OUTPATIENT
Start: 2022-03-16 | End: 2022-03-16

## 2022-03-16 RX ADMIN — PANTOPRAZOLE SODIUM 40 MG: 40 TABLET, DELAYED RELEASE ORAL at 18:57

## 2022-03-16 RX ADMIN — SODIUM CHLORIDE, PRESERVATIVE FREE 10 ML: 5 INJECTION INTRAVENOUS at 05:59

## 2022-03-16 RX ADMIN — FUROSEMIDE 40 MG: 10 INJECTION, SOLUTION INTRAMUSCULAR; INTRAVENOUS at 12:20

## 2022-03-16 RX ADMIN — APIXABAN 5 MG: 5 TABLET, FILM COATED ORAL at 18:56

## 2022-03-16 RX ADMIN — SODIUM CHLORIDE, PRESERVATIVE FREE 10 ML: 5 INJECTION INTRAVENOUS at 15:29

## 2022-03-16 RX ADMIN — CARVEDILOL 3.12 MG: 3.12 TABLET, FILM COATED ORAL at 18:57

## 2022-03-16 RX ADMIN — FUROSEMIDE 40 MG: 10 INJECTION, SOLUTION INTRAMUSCULAR; INTRAVENOUS at 12:27

## 2022-03-16 RX ADMIN — FUROSEMIDE 40 MG: 10 INJECTION, SOLUTION INTRAMUSCULAR; INTRAVENOUS at 18:57

## 2022-03-16 RX ADMIN — APIXABAN 5 MG: 5 TABLET, FILM COATED ORAL at 12:16

## 2022-03-16 RX ADMIN — CARVEDILOL 3.12 MG: 3.12 TABLET, FILM COATED ORAL at 12:27

## 2022-03-16 RX ADMIN — ACETAMINOPHEN 325MG 650 MG: 325 TABLET ORAL at 00:17

## 2022-03-16 RX ADMIN — SODIUM CHLORIDE, PRESERVATIVE FREE 10 ML: 5 INJECTION INTRAVENOUS at 23:44

## 2022-03-16 NOTE — PROGRESS NOTES
Problem: Falls - Risk of  Goal: *Absence of Falls  Description: Document [place] Fall Risk and appropriate interventions in the flowsheet. Outcome: Progressing Towards Goal  Note: Fall Risk Interventions:  Mobility Interventions: Bed/chair exit alarm         Medication Interventions: Evaluate medications/consider consulting pharmacy    Elimination Interventions: Bed/chair exit alarm,Call light in reach,Elevated toilet seat,Toileting schedule/hourly rounds              Problem: Pressure Injury - Risk of  Goal: *Prevention of pressure injury  Description: Document Rodrigo Scale and appropriate interventions in the flowsheet.   Outcome: Progressing Towards Goal  Note: Pressure Injury Interventions:  Sensory Interventions: Assess changes in LOC    Moisture Interventions: Absorbent underpads,Apply protective barrier, creams and emollients,Check for incontinence Q2 hours and as needed,Internal/External urinary devices    Activity Interventions: Chair cushion,Pressure redistribution bed/mattress(bed type)    Mobility Interventions: Float heels,Pressure redistribution bed/mattress (bed type)    Nutrition Interventions: Offer support with meals,snacks and hydration    Friction and Shear Interventions: Apply protective barrier, creams and emollients,Foam dressings/transparent film/skin sealants,HOB 30 degrees or less

## 2022-03-16 NOTE — WOUND CARE
Wound care Nurse consult: consult placed by staff nurse for RLE wound POA. Patient is a very sweet 79 y/o CF admitted 3/14/22 for CHF exacerbation. Past Medical History:   Diagnosis Date    Asthma     Hiatal hernia      Patient in bed, incontinent of urine. Heels floated and legs elevated. Significant kyphosis    RLE 2x2x0.1 cm dried up edema bullae. WOUND POA CONDITIONS    Wound Pretibial Right OPen blister from cellulitis per patient (Active)   Wound Etiology Venous 03/16/22 1600   Dressing Status New dressing applied 03/16/22 1600   Cleansed Cleansed with saline 03/16/22 1600   Dressing/Treatment Petroleum gauze 03/16/22 1600   Wound Length (cm) 2 cm 03/16/22 1600   Wound Width (cm) 2 cm 03/16/22 1600   Wound Depth (cm) 0.1 cm 03/16/22 1600   Wound Surface Area (cm^2) 4 cm^2 03/16/22 1600   Wound Volume (cm^3) 0.4 cm^3 03/16/22 1600   Wound Assessment Dry;[address]/red 03/16/22 1600   Drainage Amount None 03/16/22 1600   Wound Odor None 03/16/22 1600   Nissa-Wound/Incision Assessment Intact 03/16/22 1600   Edges Defined edges 03/16/22 1600   Wound Thickness Description Partial thickness 03/16/22 1600   Number of days: 2       Recommend:    RLE wound: 3x/week cleanse with ns moist 4x4 or soap and water. Cover wound with a piece of petroleum gauze and then a small foam dressing with silicone boarder.     [address] Ijeoma Kim RN, [employer]

## 2022-03-16 NOTE — PROGRESS NOTES
SPEECH PATHOLOGY BEDSIDE SWALLOW EVALUATION/DISCHARGE  Patient: Candance Caffey [de-identified]80 y.o. female)  Date: 3/16/2022  Primary Diagnosis: CHF exacerbation ([facility] [address] [I50.9]  Elevated troponin [R77.8]  Hyponatremia [E87.1]       Precautions: aspiration       ASSESSMENT :  Based on the objective data described below, the patient presents with suspected baseline swallow function secondary to known large hiatal hernia. Barium swallow completed today showed the following:  A large paraesophageal hernia contains nearly the entire stomach in the chest. There is nonobstructive organoaxial gastric volvulus. There is, however, slow progression of oral contrast through the esophagus; and slower progression through the stomach into the subdiaphragmatic duodenum. Esophagoesophageal reflux is associated. There is residual contrast in the vallecula and piriforms after the swallow. The patient aspirates during the examination, prompting a weak cough. This aspiration is probably from the initial swallow, rather than from esophagoesophageal reflux. During swallow evaluation, patient with delayed coughing with PO intake, and suspect that was related to large paraesophageal hernia and reflux. Although patient aspirated on barium swallow, suspect that was at least partially related to positioning and large bolus volume needed for test. Patient independently utilizes compensatory strategies of small/single bites and sips, slow rate, and appropriate positioning as her hiatal hernia is chronic and has been documented on past imaging. Note CXR showed Mild pulmonary edema pattern and Redemonstrated large hiatal hernia, and patient is currently being treating for CHF. Despite hiatal hernia and likely baseline swallow function, patient has not had recent admissions prior to this admission for respiratory issues. Therefore, recommend resume baseline diet of regular/thin liquid with continued use of compensatory strategies.  Given slow progression of oral contrast through esophagus and stomach per Barium swallow, would not recommend thickened liquids as these may pass even slower than thin liquids and increase the risk for post-prandial aspiration. Also, thickened liquids are known to increase risk for dehydration and UTI. Skilled acute therapy provided by a speech-language pathologist is not indicated at this time. PLAN :  Recommendations:  -Resume baseline diet of regular/thin liquids with aspiration precautions and use of compensatory strategies, and patient choosing foods she is able to manage. Would not recommend thickened liquids as these will likely slow the already slow progression of the bolus through the esophagus and stomach, and are known to increase the risk for dehydration and UTI  -Defer to GI for management of large paraesophageal hernia and reflux  Discharge Recommendations: None     SUBJECTIVE:   Patient stated [facility] said if I keep eating like I am, I won't have much longer to live.     OBJECTIVE:     Past Medical History:   Diagnosis Date    Asthma     Hiatal hernia      Past Surgical History:   Procedure Laterality Date    HX GI      \"Multiple abdominal surgeries. \"    HX HIP REPLACEMENT      HX OTHER SURGICAL      \"Brain Surgery\". Prior Level of Function/Home Situation:   Home Situation  Home Environment: Assisted living  One/Two Story Residence: One story  Living Alone: No  Support Systems: Other Family Member(s),Caregiver/Home Care Staff (lives with nephew Oral Estrada) & full-time caregiver)  Patient Expects to be Discharged to[de-identified] Home  Current DME Used/Available at Home: None  Diet prior to admission: regular/thin  Current Diet:  Puree/moderately thick   Cognitive and Communication Status:  Neurologic State: Alert  Orientation Level: Oriented to person,Oriented to situation,Oriented to place  Cognition: Follows commands          P.O.  Trials:  Patient Position: semi-upright in bed  Vocal quality prior to P.O.: No impairment  Consistency Presented: Thin liquid;Puree; Solid  How Presented: Self-fed/presented;Straw;Spoon     Bolus Acceptance: No impairment  Bolus Formation/Control: Impaired  Type of Impairment: Delayed  Propulsion: Delayed (# of seconds)  Oral Residue: None        Aspiration Signs/Symptoms: Delayed cough/throat clear  Pharyngeal Phase Characteristics: Multiple swallows  Effective Modifications: Small sips and bites          Oral Phase Severity: Other (comment) (WFL for age)  Pharyngeal Phase Severity : Mild-moderate  NOMS:   The NOMS functional outcome measure was used to quantify this patient's level of swallowing impairment. Based on the NOMS, the patient was determined to be at level 5 for swallow function     NOMS Swallowing Levels:  Level 1 (CN): NPO  Level 2 (CM): NPO but takes consistency in therapy  Level 3 (CL): Takes less than 50% of nutrition p.o. and continues with nonoral feedings; and/or safe with mod cues; and/or max diet restriction  Level 4 (CK): Safe swallow but needs mod cues; and/or mod diet restriction; and/or still requires some nonoral feeding/supplements  Level 5 (CJ): Safe swallow with min diet restriction; and/or needs min cues  Level 6 (CI): Independent with p.o.; rare cues; usually self cues; may need to avoid some foods or needs extra time  Level 7 ([address] [place] [address]): Independent for all p.o.  MARGARITO. (2003). National Outcomes Measurement System (NOMS): Adult Speech-Language Pathology User's Guide. Pain:  Pain Scale 1: Numeric (0 - 10)  Pain Intensity 1: 0     After treatment:   Patient left in no apparent distress in bed, Call bell within reach, Nursing notified and Caregiver / family present    COMMUNICATION/EDUCATION:   Patient was educated regarding her deficit(s) of dysphagia as this relates to her diagnosis of hiatal hernia. She demonstrated Good understanding as evidenced by verbalizing understanding.     The patient's plan of care including recommendations, planned interventions, and recommended diet changes were discussed with: Registered nurse.      Thank you for this referral.  JESUS Pablo  Time Calculation: 15 mins

## 2022-03-16 NOTE — PROGRESS NOTES
[address]  [phone]      Cardiology Progress Note      3/16/2022 11:21 AM    Admit Date: 3/14/2022    Admit Diagnosis:   CHF exacerbation ([address] [I50.9]  Elevated troponin [R77.8]  Hyponatremia [E87.1]    Subjective:     Carol Urena has no cardiac complaints.     Incomplete I/O  VSS, adding low dose BB  Afib rate controlled   No new labs     Visit Vitals  /70 (BP 1 Location: Left upper arm, BP Patient Position: At rest)   Pulse 66   Temp 97.8 °F (36.6 °C)   Resp 16   Ht 5' (1.524 m)   Wt 77.1 kg (170 lb)   SpO2 96%   BMI 33.20 kg/m²       Current Facility-Administered Medications   Medication Dose Route Frequency    metoprolol succinate (TOPROL-XL) XL tablet 12.5 mg  12.5 mg Oral DAILY    apixaban (ELIQUIS) tablet 5 mg  5 mg Oral BID    acetaminophen (TYLENOL) tablet 650 mg  650 mg Oral Q6H PRN    ondansetron (ZOFRAN) injection 4 mg  4 mg IntraVENous Q4H PRN    furosemide (LASIX) injection 40 mg  40 mg IntraVENous BID    pantoprazole (PROTONIX) tablet 40 mg  40 mg Oral ACB&D    sodium chloride (NS) flush 5-40 mL  5-40 mL IntraVENous Q8H    sodium chloride (NS) flush 5-40 mL  5-40 mL IntraVENous PRN    polyethylene glycol (MIRALAX) packet 17 g  17 g Oral DAILY PRN       Objective:      Physical Assessment:   General Appearance:  alert, cooperative, well nourished, well developed; obese,  female, appears stated age, in NAD  Eyes: sclera anicteric  Mouth/Throat: moist mucous membranes; oral pharynx clear  Neck: supple; no JVD or bruit  Pulmonary:  rales to auscultation bilaterally; good effort  Cardiovascular: irregular rate and rhythm; no murmur, click, rub, or gallop  Abdomen: soft, non-tender, non-distended; bowel sounds normal  Musculoskeletal: no swelling or deformity; moves all extremities  Extremities: 2-3+ edema; palpable distal pulses   Skin: warm to touch bilateral legs, left leg wrapped, weeping, tender   Neuro: grossly normal, [facility]   Psych: normal mood and affect given the setting      Data Review:   Recent Labs     03/15/22  0242 03/14/22  1519   WBC 7.1 7.4   HGB 12.7 13.0   HCT 39.8 41.5    200     Recent Labs     03/15/22  0242 03/14/22  1519    132*   K 4.1 4.5   CL 97 97   CO2 36* 32   GLU 91 129*   BUN 26* 31*   CREA 0.65 0.58   CA 8.5 8.8   MG 2.1  --    ALB  --  3.8   TBILI  --  0.7   ALT  --  24       Recent Labs     03/14/22  1837 03/14/22  1519   TROPHS 65* 65*         Intake/Output Summary (Last 24 hours) at 3/16/2022 1121  Last data filed at 3/15/2022 1232  Gross per 24 hour   Intake    Output 500 ml   Net -500 ml        Cardiographics     Telemetry: Afib frequent PVCs rate 70-80's   ECG: Afib   Echocardiogram:  3/15/2022    Left Ventricle: Left ventricle is moderately dilated. Normal wall thickness. Global hypokinesis present. Severely reduced left ventricular systolic function with a visually estimated EF of 20 - 25%.   Left Atrium: Left atrium is moderately dilated.   Right Ventricle: Right ventricle is moderately dilated. Mildly reduced systolic function.   Right Atrium: Right atrium is moderately dilated.   Mitral Valve: Moderate to severe transvalvular regurgitation. MV EROA by continuity equation is 0.4 cm2.   Tricuspid Valve: Mild transvalvular regurgitation. CXRAY: \"Mild pulmonary edema pattern. Redemonstrated large hiatal hernia. \"          Assessment:     Principal Problem:    CHF exacerbation ([facility] [address] (3/14/2022)    Active Problems:    Hyponatremia (3/14/2022)      Elevated troponin (3/14/2022)      HLD (hyperlipidemia) (3/15/2022)      HTN (hypertension) (3/15/2022)      Abdominal pain (3/15/2022)      Shortness of breath (3/15/2022)        Plan:   Chivo Garrido is an 80 y.o. female with a PMHx significant for Arthritis, Hiatal Hernia, PAF, HTN, Chronic edema/cellulitis admitted for [facility] exacerbation [facility] [I50.9] Elevated troponin [R77.8] Hyponatremia [E87.1].       CHF exacerbation:  History of A. Fib: Currently Afib rate controlled   She is not on any rate controlling medications  NT pro-BNP 9k  CXray showed mild pulmonary edema   Patient received IV lasix 40 mg in ED  Continue Lasix 40 mg IV BID  Documented negative 1 L, Iikely incomplete;some episodes of urinary incontinence   Strict I/O, labs, daily weights  ECHO read as above, EF 20-25%  We have added low dose BB today, monitor  Hesitant to start ARB today as well given recent marginal BPs, will monitor   EKG shows rate controlled Afib with frequent PVCs  Please keep K at 4, Mg at 2  Atrial Fibrillation CHADSVASC2 Score Stroke Risk:   80 y.o. > 76        +2    female Female +1   CHF HX: Yes    +1   HTN HX: Yes    +1   Stroke/TIA/Thromboembolism No    +0   Vascular Disease HX: No    + 0   Diabetes Mellitus No    + 0   CHADSVASC 2 Score 5      Annual Stroke Risk 7.2% - moderate-high       We discussed possible DEMETRIS and cardioversion with the patient-unlikely able to perform given her large hiatal hernia. Will consider CV in 4-6 weeks, she follows with Dr. Jorge Alberto Chu. We also discussed long term anticoagulation with Eliquis. Started. Elevated troponin:  Patient reports no ACS symptoms  Troponin flat, non-specific, 65 x 2. ECHO EF 20-25%, long standing Afib likely    Monitor telemetry   Patient is set up for stress test on 3/31. Patient cardiologist is Dr. Jorge Alberto Chu  Started on low dose BB      Large hiatal hernia  Epigastric pain  Increased Protonix to 40 mg twice daily. GI consulted, noted plans for modified barium swallow in am      History of hypertension:  Controlled  Not on any anti-hypertensive's PTA  Continue lasix as prescribed   Started on low dose BB         Jade Myers, BEAN   DNP,APRN,AG-ACNP-BC     Patient seen and examined by me with the above nurse practitioner.   I personally performed all components of the history, physical, and medical decision making and agree with the assessment and plan with minor modifications as noted.      Today the patient has improving mild volume overload due to HF (unknown if systolic or diastolic), presumed new AF, and hiatal hernia with abdominal pain. LVEF on echo noted to be 20 to 25%.     General PE  Gen:  NAD  Mental Status - Alert. General Appearance - Not in acute distress. HEENT:  PERRL, no carotid bruits or JVD  Chest and Lung Exam   Inspection: Accessory muscles - No use of accessory muscles in breathing. Auscultation:   Breath sounds: - decreased, now with some audible crackles at bases  Cardiovascular   Inspection: Jugular vein - Bilateral - Inspection Normal.   Palpation/Percussion:   Apical Impulse: - Normal.   Auscultation: Rhythm - irregular. Heart Sounds - S1 WNL and S2 WNL. No S3 or S4. Murmurs & Other Heart Sounds: Auscultation of the heart reveals - No Murmurs. Peripheral Vascular   Upper Extremity: Inspection - Bilateral - No Cyanotic nailbeds or Digital clubbing. Lower Extremity:   Palpation: Edema - Bilateral - 1-2+ edema. Abdomen:   Soft, non-tender, bowel sounds are active. Neuro: A&O times 3, CN and motor grossly WNL     Continue diuresis. Starting carvedilol. BP too low to try ACE inhibitor/ARB/ARNI at this time. She is still not well compensated. Echo reveals presumed newly diagnosed cardiomyopathy, EF 20 to 25%question tachycardia induced. Rate is controlled. Continue Eliquis (discussed with GI). Discussed risks and benefits of anticoagulation, signs and symptoms of bleeding, and to call if any concerns. Would favor rate control and AC with large hiatal hernia that may make DEMETRIS and visualization of the left atrial appendage more difficult, consider CV in 4-6 weeks. As decompensated heart failure improved, will discuss aggressiveness of cardiac evaluation and treatment with the patient.

## 2022-03-16 NOTE — PROGRESS NOTES
Gastroenterology Daily Progress Note   DANIEL Guo   for Dr. Jocelin Espinal)   [address] [place] [address] Dr Manrique Date: 3/14/2022     Follow up of large hiatal hernia and epigastric pain    Subjective:       No further epigastric pain sine the 3 hrs of mild to moderate pain she had 2 days ago  Tolerating regular diet. Occasional choking on a crumb that she can get down with drinking liquids-stable. No N/V, chest pain. SOB is stable. LE edema better with lasix. Echo done yesterday with formal report pending. Cardiology following. Started on Eliquis for Afib. NPO for UGI xray today to further evaluate hiatal hernia    Current Facility-Administered Medications   Medication Dose Route Frequency    apixaban (ELIQUIS) tablet 5 mg  5 mg Oral BID    acetaminophen (TYLENOL) tablet 650 mg  650 mg Oral Q6H PRN    ondansetron (ZOFRAN) injection 4 mg  4 mg IntraVENous Q4H PRN    furosemide (LASIX) injection 40 mg  40 mg IntraVENous BID    pantoprazole (PROTONIX) tablet 40 mg  40 mg Oral ACB&D    sodium chloride (NS) flush 5-40 mL  5-40 mL IntraVENous Q8H    sodium chloride (NS) flush 5-40 mL  5-40 mL IntraVENous PRN    polyethylene glycol (MIRALAX) packet 17 g  17 g Oral DAILY PRN        Objective:     Visit Vitals  /70 (BP 1 Location: Left upper arm, BP Patient Position: At rest)   Pulse 66   Temp 97.8 °F (36.6 °C)   Resp 16   Ht 5' (1.524 m)   Wt 77.1 kg (170 lb)   SpO2 96%   BMI 33.20 kg/m²   Blood pressure 100/70, pulse 66, temperature 97.8 °F (36.6 °C), resp. rate 16, height 5' (1.524 m), weight 77.1 kg (170 lb), SpO2 96 %. No intake/output data recorded.     03/14 1901 - 03/16 0700  In: -   Out: 1050 [Urine:1050]      Intake/Output Summary (Last 24 hours) at 3/16/2022 0846  Last data filed at 3/15/2022 1232  Gross per 24 hour   Intake    Output 1050 ml   Net -1050 ml         Physical Exam:       General: elderly white female in nad  Chest:  CTA, No rhonchi, rales or rubs.  Heart: irregularly irregular  GI: Soft, NT, ND + bowel sounds  Extremities: no edema   CNS: CN II-XII normal.      Labs:       Recent Results (from the past 24 hour(s))   ECHO ADULT COMPLETE    Collection Time: 03/15/22  4:15 PM   Result Value Ref Range    IVSd 1.0 (A) 0.6 - 0.9 cm    LVIDd 5.6 (A) 3.9 - 5.3 cm    LVIDs 5.4 cm    LVOT Diameter 2.2 cm    LVPWd 1.1 (A) 0.6 - 0.9 cm    LV Ejection Fraction A4C 21 %    LV EDV A4C 197 mL    LV ESV A4C 155 mL    LVOT Peak Gradient 3 mmHg    LVOT Mean Gradient 2 mmHg    LVOT SV 47.5 ml    LVOT Peak Velocity 0.8 m/s    LVOT VTI 12.5 cm    RVIDd 4.9 cm    LA Diameter 4.1 cm    AV Area by Peak Velocity 2.1 cm2    AV Area by VTI 2.1 cm2    AV Peak Gradient 9 mmHg    AV Mean Gradient 4 mmHg    AV Peak Velocity 1.5 m/s    AV Mean Velocity 0.9 m/s    AV VTI 22.8 cm    MV Nyquist Velocity 27 cm/s    LV E' Lateral Velocity 7 cm/s    LV E' Septal Velocity 8 cm/s    MV Area by VTI 1.8 cm2    MV Peak Gradient 3 mmHg    MV Mean Gradient 2 mmHg    MV Max Velocity 0.9 m/s    MV Mean Velocity 0.7 m/s    MV VTI 26.0 cm    MV Regurg Velocity PISA 4.6 m/s    MR .8 cm    Pulmonary Artery EDP 6 mmHg    DC Max Velocity 1.2 m/s    PV Peak Gradient 2 mmHg    PV Max Velocity 0.7 m/s    TR Peak Gradient 22 mmHg    TR Max Velocity 2.33 m/s    Ascending Aorta 3.2 cm    Fractional Shortening 2D 4 28 - 44 %    LV ESV Index A4C 89 mL/m2    LV EDV Index A4C 113 mL/m2    LVIDd Index 3.22 cm/m2    LVIDs Index 3.10 cm/m2    LV RWT Ratio 0.39     LV Mass 2D 234.3 (A) 67 - 162 g    LV Mass 2D Index 134.7 (A) 43 - 95 g/m2    LVOT Stroke Volume Index 27.3 mL/m2    LVOT Area 3.8 cm2    LA Size Index 2.36 cm/m2    Ascending Aorta Index 1.84 cm/m2    AV Velocity Ratio 0.53     LVOT:AV VTI Index 0.55     CHAMP/BSA VTI 1.2 cm2/m2    CHAMP/BSA Peak Velocity 1.2 cm2/m2    MV:LVOT VTI Index 2.08     MV EROA Cont Eq 0.4 cm2   LABRCNT(wbc:2,hgb:2,hct:2,plt:2,)  Recent Labs     03/15/22  0242 03/14/22  1519   NA 136 132*   K 4.1 4.5   CL 97 97   CO2 36* 32   BUN 26* 31*   CREA 0.65 0.58   GLU 91 129*   CA 8.5 8.8   MG 2.1  --    LABRCNT(sgot:3,gpt:3,ap:3,tbiL:3,TP:3,ALB:3,GLOB:3,ggt:3,aml:3,amyp:3,lpse:3,hlpse:3)No results for input(s): INR, PTP, APTT, INREXT in the last 72 hours. Recent Labs     03/14/22  1519   AP 41*   TP 6.8   ALB 3.8   GLOB 3.0   LPSE 64*   BRIEFLAB(B12,FOL,FOLAT,RBCF)No results found for: FOL, RBCFLABRCNT(CPK:3,CpKMB:3,ckndx:3,troiq:3)No components found for: GLPOCBRIEFLAB(CHOL,CHOLX,CHOLP,CHLST,CHOLV,HDL,HDLC,HDLP,LDL,DLDL,LDLC,DLDLP,TGL,TGLX,TRIGL,TRIGP,CHHD,CHHDX)No results for input(s): PH, PCO2, PO2 in the last 72 hours. LABRCNT(CPK:3,CpKMB:3,ckndx:3,troiq:3)DANIEL Garcia  No results for input(s): CPK, CKNDX, TROIQ in the last 72 hours. No lab exists for component: CPKMBMEShannon DANIEL Rebollar      Impression:   Epigastric pain  Hx of liv HH  CHF,  Elevated troponin     Plan and discussion:  · Ms. Yolande Dutton is an 59-year-old  lady with a past medical history of large hiatal hernia(most of the stomach herniates in it) admitted with chest/epigastric discomfort who is also known to have congestive heart failure. She has an elevated troponin and cardiology was consulted. Previous non-invasive GI work-up on a barium swallow in November 2020 revealed a large hiatal hernia with decreased esophageal peristalsis. She has mild intermittent dysphagia which she resolves by drinking copius liquids. She is currently, and has been in the past, hesitant about getting an upper endoscopy. Options including medical therapy/observation, an upper endoscopy or  re-evaluation with a barium swallow was discussed with patient and nephew in detail. · As she is being ruled out for cardiac reason for her symptoms, we suggest a barium swallow to be evaluate the size of the hiatal hernia. r/o stricture and for any possible volvulus/'twisting' in it which is ordered for today.    · Agree with supportive therapy including PPI for the time being.    · Masticate food well before swallowing           Nicolás Biggs  8:50 AM   3/16/2022  3500  [address], [address] [place] [address]  [address] [id number]  [address] [place] [address] [place]: [phone]

## 2022-03-16 NOTE — WOUND CARE
Attempted to see patient on consult for LE blister POA. Patient out of room at procedure/test. Will attempt later to see.     Susan Peacock RN  , [employer]

## 2022-03-17 ENCOUNTER — TRANSCRIBE ORDER (OUTPATIENT)
Dept: CARDIAC REHAB | Age: 87
End: 2022-03-17

## 2022-03-17 DIAGNOSIS — I50.22 SYSTOLIC CHF, CHRONIC (HCC): Primary | ICD-10-CM

## 2022-03-17 LAB
ANION GAP SERPL CALC-SCNC: 3 MMOL/L (ref 5–15)
BUN SERPL-MCNC: 20 MG/DL (ref 6–20)
BUN/CREAT SERPL: 41 (ref 12–20)
CALCIUM SERPL-MCNC: 8.3 MG/DL (ref 8.5–10.1)
CHLORIDE SERPL-SCNC: 92 MMOL/L (ref 97–108)
CO2 SERPL-SCNC: 42 MMOL/L (ref 21–32)
CREAT SERPL-MCNC: 0.49 MG/DL (ref 0.55–1.02)
GLUCOSE SERPL-MCNC: 118 MG/DL (ref 65–100)
POTASSIUM SERPL-SCNC: 3.3 MMOL/L (ref 3.5–5.1)
SODIUM SERPL-SCNC: 137 MMOL/L (ref 136–145)
TSH SERPL DL<=0.05 MIU/L-ACNC: 1.42 UIU/ML (ref 0.36–3.74)

## 2022-03-17 PROCEDURE — 74011250637 HC RX REV CODE- 250/637: Performed by: NURSE PRACTITIONER

## 2022-03-17 PROCEDURE — 36415 COLL VENOUS BLD VENIPUNCTURE: CPT

## 2022-03-17 PROCEDURE — 97535 SELF CARE MNGMENT TRAINING: CPT

## 2022-03-17 PROCEDURE — 74011250637 HC RX REV CODE- 250/637: Performed by: STUDENT IN AN ORGANIZED HEALTH CARE EDUCATION/TRAINING PROGRAM

## 2022-03-17 PROCEDURE — 97530 THERAPEUTIC ACTIVITIES: CPT

## 2022-03-17 PROCEDURE — 99222 1ST HOSP IP/OBS MODERATE 55: CPT | Performed by: SURGERY

## 2022-03-17 PROCEDURE — 74011250636 HC RX REV CODE- 250/636: Performed by: GENERAL ACUTE CARE HOSPITAL

## 2022-03-17 PROCEDURE — 94760 N-INVAS EAR/PLS OXIMETRY 1: CPT

## 2022-03-17 PROCEDURE — 77010033678 HC OXYGEN DAILY

## 2022-03-17 PROCEDURE — 99233 SBSQ HOSP IP/OBS HIGH 50: CPT | Performed by: INTERNAL MEDICINE

## 2022-03-17 PROCEDURE — 74011250637 HC RX REV CODE- 250/637: Performed by: GENERAL ACUTE CARE HOSPITAL

## 2022-03-17 PROCEDURE — 97161 PT EVAL LOW COMPLEX 20 MIN: CPT

## 2022-03-17 PROCEDURE — 84443 ASSAY THYROID STIM HORMONE: CPT

## 2022-03-17 PROCEDURE — 80048 BASIC METABOLIC PNL TOTAL CA: CPT

## 2022-03-17 PROCEDURE — 74011000250 HC RX REV CODE- 250: Performed by: STUDENT IN AN ORGANIZED HEALTH CARE EDUCATION/TRAINING PROGRAM

## 2022-03-17 PROCEDURE — 65660000000 HC RM CCU STEPDOWN

## 2022-03-17 PROCEDURE — 97165 OT EVAL LOW COMPLEX 30 MIN: CPT

## 2022-03-17 RX ORDER — TRAMADOL HYDROCHLORIDE 50 MG/1
100 TABLET ORAL
Status: DISCONTINUED | OUTPATIENT
Start: 2022-03-17 | End: 2022-03-18 | Stop reason: HOSPADM

## 2022-03-17 RX ORDER — BALSAM PERU/CASTOR OIL
OINTMENT (GRAM) TOPICAL 2 TIMES DAILY
Status: DISCONTINUED | OUTPATIENT
Start: 2022-03-17 | End: 2022-03-18 | Stop reason: HOSPADM

## 2022-03-17 RX ORDER — POTASSIUM CHLORIDE 750 MG/1
40 TABLET, FILM COATED, EXTENDED RELEASE ORAL
Status: COMPLETED | OUTPATIENT
Start: 2022-03-17 | End: 2022-03-17

## 2022-03-17 RX ORDER — FUROSEMIDE 10 MG/ML
40 INJECTION INTRAMUSCULAR; INTRAVENOUS DAILY
Status: DISCONTINUED | OUTPATIENT
Start: 2022-03-17 | End: 2022-03-18 | Stop reason: HOSPADM

## 2022-03-17 RX ORDER — POTASSIUM CHLORIDE 20 MEQ/1
20 TABLET, EXTENDED RELEASE ORAL DAILY
Status: DISCONTINUED | OUTPATIENT
Start: 2022-03-18 | End: 2022-03-18 | Stop reason: HOSPADM

## 2022-03-17 RX ADMIN — ACETAMINOPHEN 325MG 650 MG: 325 TABLET ORAL at 18:38

## 2022-03-17 RX ADMIN — PANTOPRAZOLE SODIUM 40 MG: 40 TABLET, DELAYED RELEASE ORAL at 18:10

## 2022-03-17 RX ADMIN — SODIUM CHLORIDE, PRESERVATIVE FREE 10 ML: 5 INJECTION INTRAVENOUS at 23:42

## 2022-03-17 RX ADMIN — PANTOPRAZOLE SODIUM 40 MG: 40 TABLET, DELAYED RELEASE ORAL at 09:33

## 2022-03-17 RX ADMIN — APIXABAN 5 MG: 5 TABLET, FILM COATED ORAL at 09:32

## 2022-03-17 RX ADMIN — CASTOR OIL AND BALSAM, PERU: 788; 87 OINTMENT TOPICAL at 21:00

## 2022-03-17 RX ADMIN — CARVEDILOL 3.12 MG: 3.12 TABLET, FILM COATED ORAL at 09:32

## 2022-03-17 RX ADMIN — POTASSIUM CHLORIDE 40 MEQ: 750 TABLET, EXTENDED RELEASE ORAL at 09:32

## 2022-03-17 RX ADMIN — SODIUM CHLORIDE, PRESERVATIVE FREE 10 ML: 5 INJECTION INTRAVENOUS at 06:00

## 2022-03-17 RX ADMIN — FUROSEMIDE 40 MG: 10 INJECTION, SOLUTION INTRAMUSCULAR; INTRAVENOUS at 10:35

## 2022-03-17 RX ADMIN — CASTOR OIL AND BALSAM, PERU: 788; 87 OINTMENT TOPICAL at 23:41

## 2022-03-17 RX ADMIN — TRAMADOL HYDROCHLORIDE 100 MG: 50 TABLET, COATED ORAL at 23:41

## 2022-03-17 RX ADMIN — APIXABAN 5 MG: 5 TABLET, FILM COATED ORAL at 18:10

## 2022-03-17 RX ADMIN — SODIUM CHLORIDE, PRESERVATIVE FREE 10 ML: 5 INJECTION INTRAVENOUS at 14:42

## 2022-03-17 NOTE — CARDIO/PULMONARY
Cardiopulmonary Rehab:    Chart reviewed. Pt is a 80 y.o.  F admitted with CHF exacerbation ([facility] [I50.9]  Elevated troponin [R77.8]  Hyponatremia [E87.1]. LVEF 20-25%. Nonsmoker. Pt visited. Nephew at the bedside. Living with Heart Failure Booklet given to Carol Urena. Educated using teach back method. Discussed diagnosis definition and assessed patient understanding. Reviewed importance of daily weight monitoring and Low Sodium diet (0170-6943 mg. daily). Encouraged activity and rest periods within symptom limitations and as ordered by physician. Reviewed lasix, purpose of medication, potential side effects, compliance, and what to do if dose is missed. Discussed importance of reporting signs and symptoms of exacerbation, and when to report them to the doctor, to prevent re-hospitalization. Carol MARIAJOSE Darrinkarthik was encouraged to keep all appointments with doctor. She does own a scale and admits she enjoys salty foods. Reviewed canned foods (soups) and processed meats (hot dogs) as foods to be avoided. Discussed the Cardiac Rehab Program, benefits, format, and encouraged enrollment, when eligible. Pt is wheelchair, walker/bound. Nephew at bedside denies her ability to exercise.

## 2022-03-17 NOTE — PROGRESS NOTES
PHYSICAL THERAPY EVALUATION/DISCHARGE  Patient: Georges Urena [de-identified]80 y.o. female)  Date: 3/17/2022  Primary Diagnosis: CHF exacerbation ([facility] [I50.9]  Elevated troponin [R77.8]  Hyponatremia [E87.1]       Precautions:          ASSESSMENT  Based on the objective data described below, the patient presents with baseline modified independent functional mobility, performing squat pivot transfer from EOB>>wheelchair with x1 person standbyA. Pt required additional time for completion of all mobility however able to achieve transfer with SBA only. No LOB/balance deficits noted. Caregiver present in room and reports that this is baseline for patient. All activity occurred on 2L O2 with O2 sats 91-94% throughout, recovering to 96-97% with seated rest. Pt has no further skilled therapy needs as she is functioning at her baseline level and is safe to return home w/ continued 24hr caregiver support. Functional Outcome Measure: The patient scored 45/100 on the Barthel Index outcome measure which is indicative of moderate impairment in ADLs and functional mobility. Other factors to consider for discharge: lives with 24hr caregiver support, O2 requirments     Further skilled acute physical therapy is not indicated at this time. PLAN :  Recommendation for discharge: (in order for the patient to meet his/her long term goals)  No skilled physical therapy/ follow up rehabilitation needs identified at this time. This discharge recommendation:  Has been made in collaboration with the attending provider and/or case management    IF patient discharges home will need the following DME: patient owns DME required for discharge       SUBJECTIVE:   Patient stated I hear best in this ear.     OBJECTIVE DATA SUMMARY:   HISTORY:    Past Medical History:   Diagnosis Date    Asthma     Hiatal hernia      Past Surgical History:   Procedure Laterality Date    HX GI      \"Multiple abdominal surgeries. \"    HX HIP REPLACEMENT  HX OTHER SURGICAL      \"Brain Surgery\". Prior level of function: Pt performed squat pivot transfer from EOB<>wheelchair with SPC and standbyA x1. Denies history of falls. Denies home O2 use. Pt has 24hr assist from nephew and caregiver. Personal factors and/or comorbidities impacting plan of care: hx of CVA, Afib    Home Situation  Home Environment: Private residence  # Steps to Enter: 0  Wheelchair Ramp: Yes  One/Two Story Residence: One story  Living Alone: No  Support Systems: Other Family Member(s)  Patient Expects to be Discharged to[de-identified] Home  Current DME Used/Available at Home: Cane, straight,Safety frame toliet,Walker, rolling,Wheelchair    EXAMINATION/PRESENTATION/DECISION MAKING:   Critical Behavior:  Neurologic State: Alert,Eyes open spontaneously  Orientation Level: Oriented X4  Cognition: Appropriate for age attention/concentration     Hearing: Auditory  Auditory Impairment: Hard of hearing, bilateral  Skin:  intact  Edema: none noted  Range Of Motion:  AROM: Generally decreased, functional                       Strength:    Strength: Generally decreased, functional                    Tone & Sensation:   Tone: Normal                              Coordination:  Coordination: Within functional limits  Vision:      Functional Mobility:  Bed Mobility:  Rolling: Additional time;Contact guard assistance  Supine to Sit: Additional time;Contact guard assistance     Scooting: Additional time;Contact guard assistance  Transfers:  Sit to Stand: Stand-by assistance  Stand to Sit: Stand-by assistance  Stand Pivot Transfers: Stand-by assistance     Bed to Chair: Stand-by assistance              Balance:   Sitting: Intact  Standing: Intact  Ambulation/Gait Training:                                Ambulation did not occur. Pt performed squat pivot transfer from EOB>>wheelchair with standbyA.      Functional Measure:  Barthel Index:    Bathin  Bladder: 5  Bowels: 5  Groomin  Dressing: 10  Feeding: 5  Mobility: 5  Stairs: 0  Toilet Use: 5  Transfer (Bed to Chair and Back): 10  Total: 45/100       The Barthel ADL Index: Guidelines  1. The index should be used as a record of what a patient does, not as a record of what a patient could do. 2. The main aim is to establish degree of independence from any help, physical or verbal, however minor and for whatever reason. 3. The need for supervision renders the patient not independent. 4. A patient's performance should be established using the best available evidence. Asking the patient, friends/relatives and nurses are the usual sources, but direct observation and common sense are also important. However direct testing is not needed. 5. Usually the patient's performance over the preceding 24-48 hours is important, but occasionally longer periods will be relevant. 6. Middle categories imply that the patient supplies over 50 per cent of the effort. 7. Use of aids to be independent is allowed. Score Interpretation (from [address] [address])    Independent   60-79 Minimally independent   40-59 Partially dependent   20-39 Very dependent   <20 Totally dependent     -Kiara Perez., Barthel, D.W. (1965). Functional evaluation: the Barthel Index. [address] [facility] ([address] (1997). The Barthel activities of daily living index: self-reporting versus actual performance in the old (> or = 75 years). Journal of [address] [place] [address] 45(7), [facility], .Ora.F, Ena Jefferson.Usha. (1999). Measuring the change in disability after inpatient rehabilitation; comparison of the responsiveness of the Barthel Index and Functional [place] Measure. Journal of Neurology, Neurosurgery, and Psychiatry, 66(4), 967-592. Mi Woody, N.J.A, Dany Nieto  W.J.M, & Anthony Tyler, MOraA. (2004) Assessment of post-stroke quality of life in cost-effectiveness studies: The usefulness of the Barthel Index and the EuroQoL-5D. Quality of Life Research, 15, 446-21          Physical Therapy Evaluation Charge Determination   History Examination Presentation Decision-Making   MEDIUM  Complexity : 1-2 comorbidities / personal factors will impact the outcome/ POC  MEDIUM Complexity : 3 Standardized tests and measures addressing body structure, function, activity limitation and / or participation in recreation  MEDIUM Complexity : Evolving with changing characteristics  MEDIUM Complexity : FOTO score of 26-74      Based on the above components, the patient evaluation is determined to be of the following complexity level: MEDIUM    Pain Rating:  Denied c/o pain    Activity Tolerance:   VSS on 2L O2, O2 sats 91-94% with activity on 2L       After treatment patient left in no apparent distress:   Sitting in chair, Call bell within reach and Caregiver / family present    COMMUNICATION/EDUCATION:   The patients plan of care was discussed with: Occupational therapist and Registered nurse. Fall prevention education was provided and the patient/caregiver indicated understanding., Patient/family have participated as able in goal setting and plan of care. and Patient/family agree to work toward stated goals and plan of care.     Thank you for this referral.  Yarely Castillo, PT, DPT   Time Calculation: 22 mins

## 2022-03-17 NOTE — PROGRESS NOTES
Hospitalist Progress Note    NAME: Carol Urena   :  1933   MRN:  [id number]       Assessment / Plan:    Acute systolic HF, new diagnosis   Elevated troponin  Mod to sev MV regurge   Dilated CMP  Troponin65 and repeat 65 too. BNP 9117. Continue  40 mg IV twice daily.    Echo w EF of 20-25 %  Cardiology consulted, recs CV in 4-6 weeks  -start low  Dose BB  -avoid ACE-I/ARB given low BP  Patient is set up for stress test on 3/31.  Patient cardiologist is Dr. Emily Murrieta and output, daily weights. --Check TSH    History of A. Fib  chadsvasc 5  Started on Eliquis, check coverage prior to DC  Started on BB  Cardiology consulted, recs CV in 4-6 weeks     Large hiatal hernia  Aspiration on barium esophagogram   Nonobstructive organoaxial gastric volvulus, not significantly changed from    Epigastric pain  GERD  continue Protonix to 40 mg twice daily. GI consulted, recs that [facility] repair is \"not be a great option unless she develops ongoing chest pain with eating or vomiting which could indicate gastric volvulus\"  --SLP recs regular / thins liquids          30.0 - 39.9 Obese / Body mass index is 33.2 kg/m². Estimated discharge date: 48hrs  Code status: Full  Prophylaxis: eliquis   Recommended Disposition: Home w/Family. Pt is wheelchair bound, can get from bed to wheelchair on her own. Anticipated Discharge Date:  >48 hours        Subjective:     Discussed with RN events overnight. Cough on eating  No CP or abd pain any more  Afebrile  No cough    Review of Systems:  Symptom Y/N Comments  Symptom Y/N Comments   Fever/Chills    Chest Pain     Poor Appetite    Edema     Cough    Abdominal Pain     Sputum    Joint Pain     SOB/MARKS    Pruritis/Rash     Nausea/vomit    Tolerating PT/OT     Diarrhea    Tolerating Diet     Constipation    Other       PO intake: No data found.     Wt Readings from Last 10 Encounters:   03/15/22 77.1 kg (170 lb)   10/15/19 77.1 kg (170 lb)       Objective:     VITALS:   Last 24hrs VS reviewed since prior progress note. Most recent are:  Patient Vitals for the past 24 hrs:   Temp Pulse Resp BP SpO2   03/16/22 2323 98.6 °F (37 °C) (!) 55 17 (!) 112/53 94 %   03/16/22 1952 97.9 °F (36.6 °C) 60 18 108/60 96 %   03/16/22 1456 97.2 °F (36.2 °C) 84 18 113/60 96 %   03/16/22 0837 97.8 °F (36.6 °C) 66 16 100/70 96 %   03/16/22 0831 98 °F (36.7 °C) 76 17 (!) 147/69 96 %       Intake/Output Summary (Last 24 hours) at 3/16/2022 2341  Last data filed at 3/16/2022 1438  Gross per 24 hour   Intake    Output 1400 ml   Net -1400 ml        I had a face to face encounter, and independently examined this patient on 3/16/2022, as outlined below:    PHYSICAL EXAM:  General:    No distress     HEENT: Atraumatic, anicteric sclerae, pink conjunctivae, MMM  Neck:  Supple, symmetrical  Lungs:   CTA. No Wheezing/Rhonchi. No rales. No tenderness. No Accessory muscle use. Heart:   Regular rhythm. No murmur. No JVD   GI/:   Soft. NT. ND. BS normal  Extremities: No edema. No cyanosis. No clubbing. Skin:     Not pale. Not Jaundiced. No rashes   Psych:  Good insight. Not depressed. Not anxious or agitated. Neurologic: Alert and oriented X 4. EOMs intact. No facial asymmetry. No slurred speech. Symmetrical strength, Sensation grossly intact. Labs     I reviewed today's most current labs and imaging studies. Pertinent labs include:  Recent Labs     03/15/22  0242 03/14/22  1519   WBC 7.1 7.4   HGB 12.7 13.0   HCT 39.8 41.5    200     Recent Labs     03/15/22  0242 03/14/22  1519    132*   K 4.1 4.5   CL 97 97   CO2 36* 32   GLU 91 129*   BUN 26* 31*   CREA 0.65 0.58   CA 8.5 8.8   MG 2.1  --    ALB  --  3.8   TBILI  --  0.7   ALT  --  24     XR BA SWALLOW ESOPHOGRAM    Result Date: 3/16/2022  1.  Large paraesophageal hernia, with nearly the entire stomach in the chest. 2. Nonobstructive organoaxial gastric volvulus, not significantly changed from 2020. 3. Slow progression of oral contrast through the esophagus and stomach. Esophagoesophageal reflux associated. 4. Aspiration, with weak cough. Residual contrast in the vallecula and piriforms after the swallow. Air kerma (radiation dose): 79.3 mGy      XR BA SWALLOW ESOPHOGRAM    Result Date: 3/16/2022  1. Large paraesophageal hernia, with nearly the entire stomach in the chest. 2. Nonobstructive organoaxial gastric volvulus, not significantly changed from 2020. 3. Slow progression of oral contrast through the esophagus and stomach. Esophagoesophageal reflux associated. 4. Aspiration, with weak cough. Residual contrast in the vallecula and piriforms after the swallow. Air kerma (radiation dose): 79.3 mGy     03/14/22    ECHO ADULT COMPLETE 03/16/2022 3/16/2022    Interpretation Summary    Left Ventricle: Left ventricle is moderately dilated. Normal wall thickness. Global hypokinesis present. Severely reduced left ventricular systolic function with a visually estimated EF of 20 - 25%.   Left Atrium: Left atrium is moderately dilated.   Right Ventricle: Right ventricle is moderately dilated. Mildly reduced systolic function.   Right Atrium: Right atrium is moderately dilated.   Mitral Valve: Moderate to severe transvalvular regurgitation. MV EROA by continuity equation is 0.4 cm2.   Tricuspid Valve: Mild transvalvular regurgitation. Signed by:  Vini Hudson MD on 3/16/2022 11:13 AM       Current Medications:     Current Facility-Administered Medications:     carvediloL (COREG) tablet 3.125 mg, 3.125 mg, Oral, BID WITH MEALS, Quintero Mount L, NP, 3.125 mg at 03/16/22 1857    apixaban (ELIQUIS) tablet 5 mg, 5 mg, Oral, BID, Quintero Mount L, NP, 5 mg at 03/16/22 1856    acetaminophen (TYLENOL) tablet 650 mg, 650 mg, Oral, Q6H PRN, David Burton MD, 650 mg at 03/16/22 0017    ondansetron [facility] injection 4 mg, 4 mg, IntraVENous, Q4H PRN, David Burton MD    furosemide (LASIX) injection 40 mg, 40 mg, IntraVENous, BID, Silvestre Capps MD, 40 mg at 03/16/22 1857    pantoprazole (PROTONIX) tablet 40 mg, 40 mg, Oral, ACB&D, Silvestre Capps MD, 40 mg at 03/16/22 1857    sodium chloride (NS) flush 5-40 mL, 5-40 mL, IntraVENous, Q8H, Silvestre Capps MD, 10 mL at 03/16/22 1529    sodium chloride (NS) flush 5-40 mL, 5-40 mL, IntraVENous, PRN, Silvestre Capps MD    polyethylene glycol (MIRALAX) packet 17 g, 17 g, Oral, DAILY PRN, Silvestre Capps MD     Procedures: see electronic medical records for all procedures/Xrays and details which were not copied into this note but were reviewed prior to creation of Plan. Reviewed most current lab test results and cultures  YES  Reviewed most current radiology test results   YES  Review and summation of old records today    NO  Reviewed patient's current orders and MAR    YES  PMH/ reviewed - no change compared to H&P  ________________________________________________________________________  Care Plan discussed with:    Comments   Patient x    Family  x    RN x    Care Manager x    Consultant                       x Multidiciplinary team rounds were held today with , nursing, pharmacist and clinical coordinator. Patient's plan of care was discussed; medications were reviewed and discharge planning was addressed.      ________________________________________________________________________  Total NON critical care TIME:  38   Minutes    Total CRITICAL CARE TIME Spent:   Minutes non procedure based      Comments   >50% of visit spent in counseling and coordination of care x     This includes time during multidisciplinary rounds if indicated above   ________________________________________________________________________  Suella Dunk, MD

## 2022-03-17 NOTE — PROGRESS NOTES
Problem: Falls - Risk of  Goal: *Absence of Falls  Description: Document Jono Robledo Fall Risk and appropriate interventions in the flowsheet. Outcome: Progressing Towards Goal  Note: Fall Risk Interventions:  Mobility Interventions: Bed/chair exit alarm,Patient to call before getting OOB,Utilize walker, cane, or other assistive device         Medication Interventions: Bed/chair exit alarm,Patient to call before getting OOB,Teach patient to arise slowly    Elimination Interventions: Patient to call for help with toileting needs,Stay With Me (per policy),Toileting schedule/hourly rounds,Bed/chair exit alarm,Call light in reach              Problem: Pressure Injury - Risk of  Goal: *Prevention of pressure injury  Description: Document Rodrigo Scale and appropriate interventions in the flowsheet. Outcome: Progressing Towards Goal  Note: Pressure Injury Interventions:  Sensory Interventions: Float heels,Assess changes in LOC,Assess need for specialty bed,Monitor skin under medical devices,Minimize linen layers,Keep linens dry and wrinkle-free,Turn and reposition approx. every two hours (pillows and wedges if needed)    Moisture Interventions: Check for incontinence Q2 hours and as needed,Minimize layers,Moisture barrier,Maintain skin hydration (lotion/cream),Internal/External urinary devices,Absorbent underpads,Apply protective barrier, creams and emollients    Activity Interventions: Assess need for specialty bed,Pressure redistribution bed/mattress(bed type)    Mobility Interventions: HOB 30 degrees or less,Assess need for specialty bed,Turn and reposition approx.  every two hours(pillow and wedges)    Nutrition Interventions: Document food/fluid/supplement intake,Offer support with meals,snacks and hydration    Friction and Shear Interventions: Lift sheet,HOB 30 degrees or less,Minimize layers,Lift team/patient mobility team,Feet elevated on foot rest,Foam dressings/transparent film/skin sealants,Apply protective barrier, creams and emollients

## 2022-03-17 NOTE — PROGRESS NOTES
[address], [place], [address] [facility]  [phone]      Cardiology Progress Note      3/17/2022 1000 AM    Admit Date: 3/14/2022    Admit Diagnosis:   CHF exacerbation ([address] [I50.9]  Elevated troponin [R77.8]  Hyponatremia [E87.1]    Subjective:     Carol Urena has no cardiac complaints, she is sitting up eating breakfast.     Incomplete I/O, roughly negative 2.4L  VSS, continue low dose BB   Now NSR with 1 degree AVB   K 3.3, replace     Visit Vitals  BP (!) 108/92   Pulse 86   Temp 97.2 °F (36.2 °C)   Resp 18   Ht 5' (1.524 m)   Wt 77.1 kg (170 lb)   SpO2 100%   BMI 33.20 kg/m²       Current Facility-Administered Medications   Medication Dose Route Frequency    furosemide (LASIX) injection 40 mg  40 mg IntraVENous DAILY    [START ON 3/18/2022] potassium chloride (K-DUR, KLOR-CON M20) SR tablet 20 mEq  20 mEq Oral DAILY    carvediloL (COREG) tablet 3.125 mg  3.125 mg Oral BID WITH MEALS    apixaban (ELIQUIS) tablet 5 mg  5 mg Oral BID    acetaminophen (TYLENOL) tablet 650 mg  650 mg Oral Q6H PRN    ondansetron (ZOFRAN) injection 4 mg  4 mg IntraVENous Q4H PRN    pantoprazole (PROTONIX) tablet 40 mg  40 mg Oral ACB&D    sodium chloride (NS) flush 5-40 mL  5-40 mL IntraVENous Q8H    sodium chloride (NS) flush 5-40 mL  5-40 mL IntraVENous PRN    polyethylene glycol (MIRALAX) packet 17 g  17 g Oral DAILY PRN       Objective:      Physical Assessment:   General Appearance:  alert, cooperative, well nourished, well developed; obese,  female, appears stated age, in NAD  Eyes: sclera anicteric  Mouth/Throat: moist mucous membranes; oral pharynx clear  Neck: supple; no JVD or bruit  Pulmonary:  rales to auscultation bilaterally; good effort  Cardiovascular: irregular rate and rhythm; no murmur, click, rub, or gallop  Abdomen: soft, non-tender, non-distended; bowel sounds normal  Musculoskeletal: no swelling or deformity; moves all extremities  Extremities: 1-2+ edema; palpable distal pulses   Skin: warm to touch bilateral legs, left leg wrapped, weeping, tender   Neuro: grossly normal, [place]   Psych: normal mood and affect given the setting      Data Review:   Recent Labs     03/15/22  0242 03/14/22  1519   WBC 7.1 7.4   HGB 12.7 13.0   HCT 39.8 41.5    200     Recent Labs     03/17/22  0547 03/15/22  0242 03/14/22  1519    136 132*   K 3.3* 4.1 4.5   CL 92* 97 97   CO2 42* 36* 32   * 91 129*   BUN 20 26* 31*   CREA 0.49* 0.65 0.58   CA 8.3* 8.5 8.8   MG  --  2.1  --    ALB  --   --  3.8   TBILI  --   --  0.7   ALT  --   --  24       Recent Labs     03/14/22  1837 03/14/22  1519   TROPHS 65* 65*         Intake/Output Summary (Last 24 hours) at 3/17/2022 1110  Last data filed at 3/16/2022 1438  Gross per 24 hour   Intake    Output 1400 ml   Net -1400 ml        Cardiographics     Telemetry: NSR 1 degree, frequent PVC's, bigeminy   ECG: Afib   Echocardiogram:  3/15/2022    Left Ventricle: Left ventricle is moderately dilated. Normal wall thickness. Global hypokinesis present. Severely reduced left ventricular systolic function with a visually estimated EF of 20 - 25%.   Left Atrium: Left atrium is moderately dilated.   Right Ventricle: Right ventricle is moderately dilated. Mildly reduced systolic function.   Right Atrium: Right atrium is moderately dilated.   Mitral Valve: Moderate to severe transvalvular regurgitation. MV EROA by continuity equation is 0.4 cm2.   Tricuspid Valve: Mild transvalvular regurgitation. CXRAY: \"Mild pulmonary edema pattern. Redemonstrated large hiatal hernia. \"          Assessment:     Principal Problem:    CHF exacerbation ([address] (3/14/2022)    Active Problems:    Hyponatremia (3/14/2022)      Elevated troponin (3/14/2022)      HLD (hyperlipidemia) (3/15/2022)      HTN (hypertension) (3/15/2022)      Abdominal pain (3/15/2022)      Shortness of breath (3/15/2022)        Plan:   Jean Vallejo is an 80 y.o. female with a PMHx significant for Arthritis, Hiatal Hernia, PAF, HTN, Chronic edema/cellulitis admitted for CHF exacerbation ([facility] [address] [I50.9] Elevated troponin [R77.8] Hyponatremia [E87.1]. CHF exacerbation:  History of A. Fib: Currently back in NSR with 1 degree AVB   NT pro-BNP 9k  CXray showed mild pulmonary edema   Patient received IV lasix 40 mg in ED  Reduce Lasix 40 mg IV daily  Documented negative 2.4 L, Iikely incomplete;some episodes of urinary incontinence   Strict I/O, labs, daily weights  ECHO read as above, EF 20-25%  Patient is not a candidate for LifeVest, as I do not believe she will have the manual dexterity to respond to alarms which could be false alarms. Therefore she is at excessively high risk for defibrillation due to a false alarm. Tolerating low dose BB  Will hold off on adding ACEi/ARB/ARNI until outpatient due to borderline low BPs  EKG shows rate controlled Afib with frequent PVCs  Please keep K at 4, Mg at 2  K 3.3 this am, replace   Atrial Fibrillation CHADSVASC2 Score Stroke Risk:   80 y.o. > 76        +2    female Female +1   CHF HX: Yes    +1   HTN HX: Yes    +1   Stroke/TIA/Thromboembolism No    +0   Vascular Disease HX: No    + 0   Diabetes Mellitus No    + 0   CHADSVASC 2 Score 5      Annual Stroke Risk 7.2% - moderate-high       Discussed long term anticoagulation with Eliquis. Continue. Elevated troponin:  Patient reports no ACS symptoms  Troponin flat, non-specific, 65 x 2. ECHO EF 20-25%, long standing Afib likely    Monitor telemetry   Patient is set up for stress test on 3/31. Patient cardiologist is Dr. Na Chandra  Continue on low dose BB      Large hiatal hernia  Epigastric pain  Continue Protonix  40 mg twice daily. GI consulted, noted results of modified barium shows some aspiration     History of hypertension:   Controlled  Continue lasix as prescribed   Continue on low dose BB     She will need to follow up closely with Dr. Na Chandra as outpatient.          Genet Jefferson NP DNP,APRN,AG-ACNP-BC    Patient seen and examined by me with the above nurse practitioner. I personally performed all components of the history, physical, and medical decision making and agree with the assessment and plan with minor modifications as noted. Today the patient presents with improving heart failure, rate controlled and back in sinus rhythm. General PE  Gen:  NAD  Mental Status - Alert. General Appearance - Not in acute distress. HEENT:  PERRL, no carotid bruits or JVD  Chest and Lung Exam   Inspection: Accessory muscles - No use of accessory muscles in breathing. Auscultation:   Breath sounds: - Normal.   Cardiovascular   Inspection: Jugular vein - Bilateral - Inspection Normal.   Palpation/Percussion:   Apical Impulse: - Normal.   Auscultation: Rhythm -irregular. Heart Sounds - S1 WNL and S2 WNL. No S3 or S4. Murmurs & Other Heart Sounds: Auscultation of the heart reveals - No Murmurs. Peripheral Vascular   Upper Extremity: Inspection - Bilateral - No Cyanotic nailbeds or Digital clubbing. Lower Extremity:   Palpation: Edema - Bilateral - No edema. Abdomen:   Soft, non-tender, bowel sounds are active. Neuro: A&O times 3, CN and motor grossly WNL    Continue with anticoagulation. Rate is controlled. Not a candidate for LifeVest continue annual dexterity. Has planned outpatient stress test.  Likely okay for discharge soon from a cardiac standpoint on a diuretic and Eliquis, meds that we are currently giving.

## 2022-03-17 NOTE — PROGRESS NOTES
Received notification from bedside RN about patient with regards to: CO2 42, noted K+ 3.3    Intervention given: Kdur 40 meq PO x 1 dose ordered

## 2022-03-17 NOTE — WOUND CARE
Wound care consult: consult placed by staff nurse for \"buttocks wound. Patient is an 79 y/o CF   Past Medical History:   Diagnosis Date    Asthma     Hiatal hernia          Patient is incontinent of urine. Does not appear to be on turn team or turned by patient. Patient has severe kyphosis and sits hunched over in bed. With legs elevated. Recommend:    Venelex ointment BID and TURN patient side to side off loading sacrum.     Carloz Galindo RN, [facility]

## 2022-03-17 NOTE — PROGRESS NOTES
Gastroenterology Daily Progress Note   [place], [place]   for Dr. Yves Arnold)   [address] [place] [address] Dr Manrique Date: 3/14/2022     Follow up of large hiatal hernia and epigastric pain    Subjective:       Barium Swallow 3/16/22: IMPRESSION  1. Large paraesophageal hernia, with nearly the entire stomach in the chest.  2. Nonobstructive organoaxial gastric volvulus, not significantly changed from  2020.  3. Slow progression of oral contrast through the esophagus and stomach. Esophagoesophageal reflux associated. 4. Aspiration, with weak cough. Residual contrast in the vallecula and piriforms  after the swallow. No further epigastric pain sine the 3 hrs of mild to moderate pain she had 2 days ago  Tolerating regular diet. Occasional choking on a crumb that she can get down with drinking liquids-stable. No N/V, chest pain. SOB is stable. LE edema better with lasix. Echo done yesterday: severely reduced EF 20-25% , moderately dilated LA, RV, RA, mod to severe TR  Cardiology following. Started on Eliquis for Afib. \"Echo reveals presumed newly diagnosed cardiomyopathy, EF 20 to 25%question tachycardia induced.    Rate is controlled. Continue Eliquis (discussed with GI).  Discussed risks and benefits of anticoagulation, signs and symptoms of bleeding, and to call if any concerns. Would favor rate control and AC with large hiatal hernia that may make DEMETRIS and visualization of the left atrial appendage more difficult, consider CV in 4-6 weeks.    As decompensated heart failure improved, will discuss aggressiveness of cardiac evaluation and treatment with the patient. \"      Current Facility-Administered Medications   Medication Dose Route Frequency    potassium chloride SR (KLOR-CON 10) tablet 40 mEq  40 mEq Oral NOW    carvediloL (COREG) tablet 3.125 mg  3.125 mg Oral BID WITH MEALS    apixaban (ELIQUIS) tablet 5 mg  5 mg Oral BID    acetaminophen (TYLENOL) tablet 650 mg  650 mg Oral Q6H PRN    ondansetron (ZOFRAN) injection 4 mg  4 mg IntraVENous Q4H PRN    furosemide (LASIX) injection 40 mg  40 mg IntraVENous BID    pantoprazole (PROTONIX) tablet 40 mg  40 mg Oral ACB&D    sodium chloride (NS) flush 5-40 mL  5-40 mL IntraVENous Q8H    sodium chloride (NS) flush 5-40 mL  5-40 mL IntraVENous PRN    polyethylene glycol (MIRALAX) packet 17 g  17 g Oral DAILY PRN        Objective:     Visit Vitals  BP 94/68   Pulse 68   Temp 97.2 °F (36.2 °C)   Resp 18   Ht 5' (1.524 m)   Wt 77.1 kg (170 lb)   SpO2 100%   BMI 33.20 kg/m²   Blood pressure 94/68, pulse 68, temperature 97.2 °F (36.2 °C), resp. rate 18, height 5' (1.524 m), weight 77.1 kg (170 lb), SpO2 100 %. No intake/output data recorded. 03/15 1901 - 03/17 0700  In: -   Out: 1400 [Urine:1400]      Intake/Output Summary (Last 24 hours) at 3/17/2022 9924  Last data filed at 3/16/2022 1438  Gross per 24 hour   Intake    Output 1400 ml   Net -1400 ml         Physical Exam:       General: elderly white female in nad  Chest:  CTA, No rhonchi, rales or rubs.   Heart: irregularly irregular  GI: Soft, NT, ND + bowel sounds  Extremities: no edema   CNS: CN II-XII normal.      Labs:       Recent Results (from the past 24 hour(s))   METABOLIC PANEL, BASIC    Collection Time: 03/17/22  5:47 AM   Result Value Ref Range    Sodium 137 136 - 145 mmol/L    Potassium 3.3 (L) 3.5 - 5.1 mmol/L    Chloride 92 (L) 97 - 108 mmol/L    CO2 42 (HH) 21 - 32 mmol/L    Anion gap 3 (L) 5 - 15 mmol/L    Glucose 118 (H) 65 - 100 mg/dL    BUN 20 6 - 20 MG/DL    Creatinine 0.49 (L) 0.55 - 1.02 MG/DL    BUN/Creatinine ratio 41 (H) 12 - 20      GFR est AA >60 >60 ml/min/1.73m2    GFR est non-AA >60 >60 ml/min/1.73m2    Calcium 8.3 (L) 8.5 - 10.1 MG/DL   TSH 3RD GENERATION    Collection Time: 03/17/22  5:47 AM   Result Value Ref Range    TSH 1.42 0.36 - 3.74 uIU/mL   LABRCNT(wbc:2,hgb:2,hct:2,plt:2,)  Recent Labs     03/17/22  0547 03/15/22  0242 03/14/22  1519    136 132*   K 3.3* 4.1 4.5   CL 92* 97 97   CO2 42* 36* 32   BUN 20 26* 31*   CREA 0.49* 0.65 0.58   * 91 129*   CA 8.3* 8.5 8.8   MG  --  2.1  --    LABRCNT(sgot:3,gpt:3,ap:3,tbiL:3,TP:3,ALB:3,GLOB:3,ggt:3,aml:3,amyp:3,lpse:3,hlpse:3)No results for input(s): INR, PTP, APTT, INREXT, INREXT in the last 72 hours. Recent Labs     03/14/22  1519   AP 41*   TP 6.8   ALB 3.8   GLOB 3.0   LPSE 64*   BRIEFLAB(B12,FOL,FOLAT,RBCF)No results found for: FOL, RBCFLABRCNT(CPK:3,CpKMB:3,ckndx:3,troiq:3)No components found for: GLPOCBRIEFLAB(CHOL,CHOLX,CHOLP,CHLST,CHOLV,HDL,HDLC,HDLP,LDL,DLDL,LDLC,DLDLP,TGL,TGLX,TRIGL,TRIGP,CHHD,CHHDX)No results for input(s): PH, PCO2, PO2 in the last 72 hours. LABRCNT(CPK:3,CpKMB:3,ckndx:3,troiq:3)DANIEL Stallworth  No results for input(s): CPK, CKNDX, TROIQ in the last 72 hours. No lab exists for component: CPKMBMEShannon DANIEL Clark      Impression:   Epigastric pain  Hx of liv HH  CHF,  Elevated troponin     Plan and discussion:  · Ms. Evelio Mahajan is an 80-year-old  lady with a large hiatal hernia (most of the stomach herniates in it) admitted with chest/epigastric discomfort who also has severe CHF and cardiomyopathy presumably worsened by Afib with RVR. Cardiology following and deciding how aggressive to be with treatment. · UGI yesterday shows persistent large hiatal hernia x 2020 with most of stomach in chest.  It is paraesophageal with organoaxial gastric volvulus though not obstructive and not significantly changed from 2020. She has decreased esophageal peristalsis. She has mild intermittent dysphagia which she resolves by drinking copius liquids. She is no longer having epigastric/chest pain which brought her in and no N/V. She lives with her nephew. · Continue with supportive therapy, BID PPI. · While she is not an ideal surgical candidate, we feel a non urgent surgical opinion is warranted given the volvulus and presenting sxs.             Azul Palacios [place], [place]  9:08 AM   3/17/2022  [id number] [facility], [address] [address] [place]: [phone]    The patient was seen and examined independently by me. I have discussed the case with the mid-level provider in detail. I have reviewed the patient's chart and the note. I personally performed all components of the history, physical, and medical decision making and agree with the assessment and plan, with minor modifications as noted. Please see APPs note for full details. Physical exam:  General:AAO x 3,   HEENT:  EOMI,   Chest:  CTA,Heart: S1, S2, RRR  GI: Soft, NT, ND + bowel sounds  Extremities: No edema    Data Review:  reviewed     Impression:   Large paraesophageal hernia with organoaxial gastric volvulus without obstruction similar to previous barium swallow. Dysphagia  Congestive heart failure  A. Fib  Elevated troponin    Plan and discussion:  When I saw her this morning she was on the phone and was about to have her breakfast.  This was withheld as she is supposed to get \" the procedure done\"  Upper GI series shows a persistent large hiatal hernia with paraesophageal anatomy and organoaxial gastric volvulus(unchanged) with decreased esophageal peristalsis. I spoke with her nephew, Tiana Farrell, yesterday in detail. The patient and the nephew did not want any intervention procedure performed when I spoke w/ them yesterday. This was before the upper GI series. Now that we have a confirmed paraesophageal component with organoaxial gastric volvulus I am requesting input from our surgical colleagues to see the patient in case the patient and family decides differently. Signed By: Josh Pate MD    3/17/2022  1:16 PM

## 2022-03-17 NOTE — CONSULTS
Surgery Consult  Consulted by: DANIEL Tran  PCP: Clare Brambila MD    Thank you for allowing me to participate in your patient's care. Please call me with any questions. Assessment:   Large paraesophageal hernia. Tolerating diet. Upper esophageal dysphagia is chronic. Cardiomyopathy. Patient not interested in surgical repair. Plan:   The patient understands that given her cardiomyopathy and age surgical repair of this very large paraesophageal hernia would be extremely risky. In addition, it appears that the upper esophageal dysphagia is actually more of a problem. I have recommended a low residue diet given the slow transit through the stomach. I discussed this with the patient and her daughter. They were in agreement. No plans for surgery. Will sign off. Thank you. Problem List:   Principal Problem:    CHF exacerbation ([address] (3/14/2022)    Active Problems:    Hyponatremia (3/14/2022)      Elevated troponin (3/14/2022)      HLD (hyperlipidemia) (3/15/2022)      HTN (hypertension) (3/15/2022)      Abdominal pain (3/15/2022)      Shortness of breath (3/15/2022)        Subjective:      Carol Urena is a 80 y.o. female who is seen in consultation large paraesophageal hernia with organoaxial volvulus but no evidence of obstruction. She was having epigastric discomfort but that is resolved. She is now eating well. She does have frequent upper esophageal dysphagia. She has choking spells. She understands that her heart is not in great shape and she has been told in the past that surgery was not an option for her paraesophageal hernia. She also points out that she is approaching 80years of age and does not think she could tolerate an invasive operation. Presently she has no pain. She is enjoying lunch as we speak. Objective:   Blood pressure (!) 108/92, pulse 86, temperature 97.2 °F (36.2 °C), resp.  rate 18, height 5' (1.524 m), weight 75.8 kg (167 lb), SpO2 100 %.  Temp (24hrs), Av.9 °F (36.6 °C), Min:97.2 °F (36.2 °C), Max:98.6 °F (37 °C)      Physical Exam:  PHYSICAL EXAM:  Gen:  [x]     A&O     [x]      No acute distress    [x]     non-toxic     []     ill apearing     []     Critical        HEENT:   [x]     anicteric    []      scleral icterus    [x]     moist mucosa     []     dry mucosa    RESP:   Clear but decreased in the bases. CARD:  [x]     regular rate and rhythm     [x]     No murmurs/rubs/gallops    []     irregular rhythm     []     Murmur     []     Rubs     []     Gallops    ABD:     Truncal obesity, abdomen soft and nontender. SKIN:   [x]     normal      []Rashes      []Ulcers     EXT:  [x]      No CCE     []2+ pulses throughout    []      Clubbing     []Cyanosis     []Edema     []diminished pulses    NEUR:  []     Strength normal     []weakness   []LUE     []RUE     []LLE     []RLE    [x]     follows commands          PSYCH:   insight []Poor   [x]good                      []     depressed     []     anxious     []     agitated    Past Medical History:   Diagnosis Date    Asthma     Hiatal hernia      Past Surgical History:   Procedure Laterality Date    HX GI      \"Multiple abdominal surgeries. \"    HX HIP REPLACEMENT      HX OTHER SURGICAL      \"Brain Surgery\". No family history on file. Social History     Socioeconomic History    Marital status:    Tobacco Use    Smoking status: Never Smoker    Smokeless tobacco: Never Used   Substance and Sexual Activity    Alcohol use: Not Currently    Drug use: Never    Sexual activity: Not Currently      Prior to Admission medications    Medication Sig Start Date End Date Taking? Authorizing Provider   polyethylene glycol (MIRALAX) 17 gram/dose powder Take 17 g by mouth two (2) times a day. 1 tablespoon with 8 oz of water daily  Indications: constipation 10/15/19   Sona Lopez MD   levoFLOXacin (LEVAQUIN) 500 mg tablet Take 1 Tab by mouth daily.  10/15/19   Sona Lopez MD predniSONE (STERAPRED DS) 10 mg dose pack Take as directed on packaging 10/15/19   Liz Lagunas MD   albuterol (PROVENTIL HFA, VENTOLIN HFA, PROAIR HFA) 90 mcg/actuation inhaler Take 2 Puffs by inhalation every four (4) hours as needed for Wheezing.  10/15/19   Liz Lagunas MD     ALLERGIES:    Allergies   Allergen Reactions    Dilantin [Phenytoin Sodium Extended] Rash    Penicillin G Rash       Review of Systems:  (unchecked were asked but negative)  Constitutional: [] Fever/chills   [] Sweats   [] Loss of appetite   [] Fatigue/weakness   [] Weight loss  Head & Neck:   [] Headaches   [] Visual loss   [] Hearing loss   [] Thyroid problems  Cardiovascular:   [] Stroke   [] Calf pain when walking   [] Chest pain   [] Rapid heart beat       [] Heart attack   [] Stents   [x] Congestive heart failure   [] Leg swelling   [] Murmur  Respiratory:   [] Sleep apnea   [] Cough/congestion   [] Shortness of breath   [] Wheezing/asthma      [] COPD/emphysema  Gastrointestinal:   [] Frequent indigestion   [x] Trouble swallowing   [] Nausea   [] Vomiting   [] Bloating   [] Abd pain       [] Diarrhea   [] Constipation   [] Blood in stool   [] Ulcers   [] Intestinal disease   [] Hepatitis    Genitourinary:   [] Painful urination   [] Difficulty urinating   [] Frequent urination       [] Enlarged prostate   [] Vasectomy   [] Blood in urine   [] Dialysis  Musculoskeletal:  [] Muscle aches   [] Back pain   [] Joint pain  Neurologic:    [] Seizures   [] Dizziness   [] Numbness  Hematologic:   [] Nosebleeds   [] Easy bruising   [] Anemia   [] Easy bleeding  Psychiatric:    [] Depression   [] Anxiety   [] Bipolar disorder   [] Schizophrenia                                  []     Unable to obtain  ROS due to  []     mental status change  []     sedated   []     intubated    LABS:  Recent Labs     03/15/22  0242 03/14/22  1519   WBC 7.1 7.4   HGB 12.7 13.0   HCT 39.8 41.5    200     Recent Labs     03/17/22  0547 03/15/22  0242 03/14/22  1519    136 132*   K 3.3* 4.1 4.5   CL 92* 97 97   CO2 42* 36* 32   BUN 20 26* 31*   CREA 0.49* 0.65 0.58   * 91 129*   CA 8.3* 8.5 8.8   MG  --  2.1  --      Recent Labs     03/14/22  1519   AP 41*   TP 6.8   ALB 3.8   GLOB 3.0   LPSE 64*     No results for input(s): INR, PTP, APTT, INREXT in the last 72 hours.       Signed By: Zachariah Galdamez MD     March 17, 2022

## 2022-03-17 NOTE — PROGRESS NOTES
OCCUPATIONAL THERAPY EVALUATION/DISCHARGE  Patient: Liz Urena [de-identified]80 y.o. female)  Date: 3/17/2022  Primary Diagnosis: CHF exacerbation ([facility] [address] [I50.9]  Elevated troponin [R77.8]  Hyponatremia [E87.1]       Precautions: Fall    ASSESSMENT  Based on the objective data described below, the patient presents with general weakness and kyphotic posture but appears to be functioning at her baseline for self-care and functional mobility. Overall, patient completed self-care with set-up/supervision to min assist and functional mobility with stand-by to contact guard assist with additional time provided. Patient received semisupine in bed on 2L O2 with caregiver present in room and agreeable for therapy. Patient came EOB with contact guard assist and demonstrated intact sitting balance. Patient transferred to w/c using squst pivot technique without assist from therapist. Once seated, patient was able to complete LB dressing with set-up/supervision by bending forward. Caregiver reported this is patient's current baseline. Patient's O2 sats remained >90% with activity on 2L O2 with SOB noted following LB dressing. Patient was left sitting in w/c with all needs met, VSS, and caregiver present in room. Patient does not have any additional acute OT needs at this time and does not require OT services at discharge. Current Level of Function (ADLs/self-care): set-up/supervision to min assist for self-care, stand-by to contact guard assist for functional mobility     Functional Outcome Measure: The patient scored 45/100 on the Barthel Index outcome measure which is indicative of being partially dependent in ADLs. Other factors to consider for discharge: fall risk, 24/7 caregiver support     PLAN :    Recommendation for discharge: (in order for the patient to meet his/her long term goals)  No skilled occupational therapy/ follow up rehabilitation needs identified at this time.     This discharge recommendation:  Has not yet been discussed the attending provider and/or case management    IF patient discharges home will need the following DME: patient owns DME required for discharge       SUBJECTIVE:   Patient stated I get myself dressed at home.     OBJECTIVE DATA SUMMARY:   HISTORY:   Past Medical History:   Diagnosis Date    Asthma     Hiatal hernia      Past Surgical History:   Procedure Laterality Date    HX GI      \"Multiple abdominal surgeries. \"    HX HIP REPLACEMENT      HX OTHER SURGICAL      \"Brain Surgery\". Prior Level of Function/Environment/Context: Patient was able to transfer EOB <> w/c at baseline using [facility] and stand-by assist from caregiver. Patient could dressing, toilet, and feed herself but required assist for bathing. Patient has 24/7 supervision/assist from caregiver and nephew. Expanded or extensive additional review of patient history:   Home Situation  Home Environment: Private residence  # Steps to Enter: 0  Wheelchair Ramp: Yes  One/Two Story Residence: One story  Living Alone: No  Support Systems: Other Family Member(s)  Patient Expects to be Discharged to[de-identified] Home  Current DME Used/Available at Home: Cane, straight,Safety frame toliet,Walker, rolling,Wheelchair  Tub or Shower Type: Other (comment) (sponge bathes)    Hand dominance: Right    EXAMINATION OF PERFORMANCE DEFICITS:  Cognitive/Behavioral Status:  Neurologic State: Alert  Orientation Level: Oriented X4  Cognition: Appropriate decision making; Follows commands  Perception: Appears intact  Perseveration: No perseveration noted  Safety/Judgement: Awareness of environment;Good awareness of safety precautions    Hearing:   Auditory  Auditory Impairment: Hard of hearing, bilateral    Vision/Perceptual:    Acuity: Within Defined Limits      Range of Motion:  AROM: Generally decreased, functional    Strength:  Strength: Generally decreased, functional    Coordination:  Coordination: Within functional limits  Fine Motor Skills-Upper: Left Intact; Right Intact    Gross Motor Skills-Upper: Left Intact; Right Intact    Tone & Sensation:  Tone: Normal    Balance:  Sitting: Intact  Standing: Intact    Functional Mobility and Transfers for ADLs:  Bed Mobility:  Rolling: Additional time;Contact guard assistance  Supine to Sit: Additional time;Contact guard assistance  Scooting: Additional time;Contact guard assistance    Transfers:  Sit to Stand: Stand-by assistance  Stand to Sit: Stand-by assistance  Bed to Chair: Stand-by assistance    ADL Assessment:  Feeding: Setup;Supervision  Oral Facial Hygiene/Grooming: Setup;Supervision (seated)  Bathing: Minimum assistance  Upper Body Dressing: Setup;Supervision  Lower Body Dressing: Setup;Supervision  Toileting: Minimum assistance    ADL Intervention and task modifications:    Grooming  Position Performed: Other (comment) (seated in w/c)  Washing Hands: Set-up; Supervision    Lower Body Dressing Assistance  Socks: Set-up; Supervision  Leg Crossed Method Used: No  Position Performed: Bending forward method (seated in w/c)    Cognitive Retraining  Safety/Judgement: Awareness of environment;Good awareness of safety precautions    Functional Measure:    Barthel Index:  Bathin  Bladder: 5  Bowels: 5  Groomin  Dressing: 10  Feedin  Mobility: 5  Stairs: 0  Toilet Use: 5  Transfer (Bed to Chair and Back): 10  Total: 45/100      The Barthel ADL Index: Guidelines  1. The index should be used as a record of what a patient does, not as a record of what a patient could do. 2. The main aim is to establish degree of independence from any help, physical or verbal, however minor and for whatever reason. 3. The need for supervision renders the patient not independent. 4. A patient's performance should be established using the best available evidence. Asking the patient, friends/relatives and nurses are the usual sources, but direct observation and common sense are also important.  However direct testing is not needed. 5. Usually the patient's performance over the preceding 24-48 hours is important, but occasionally longer periods will be relevant. 6. Middle categories imply that the patient supplies over 50 per cent of the effort. 7. Use of aids to be independent is allowed. Score Interpretation (from [address])    Independent   60-79 Minimally independent   40-59 Partially dependent   20-39 Very dependent   <20 Totally dependent     -Kiara Perez., Barthel, DOraW. (1965). Functional evaluation: the Barthel Index. [address] ([address] (1997). The Barthel activities of daily living index: self-reporting versus actual performance in the old (> or = 75 years). Journal of [address] [place] [address] 45(7), [facility], JUAN, Nitish Anton., Maia Terannett. (1999). Measuring the change in disability after inpatient rehabilitation; comparison of the responsiveness of the Barthel Index and Functional [place] Measure. Journal of Neurology, Neurosurgery, and Psychiatry, 66(4), 617-377. Juan Chavis, N.J.A, CHELSEY Hilario, & Jean-Pierre Ledesma MISIAH. (2004) Assessment of post-stroke quality of life in cost-effectiveness studies: The usefulness of the Barthel Index and the EuroQoL-5D. Quality of Life Research, 13, 244-60      Based on the above components, the patient evaluation is determined to be of the following complexity level: MEDIUM  Pain Rating:  Patient did not c/o pain during session. Activity Tolerance:   Good and desaturates with exertion and requires oxygen    After treatment patient left in no apparent distress:    Sitting in chair, Call bell within reach and Caregiver / family present    COMMUNICATION/EDUCATION:   The patients plan of care was discussed with: Physical therapist and Registered nurse.      Thank you for this referral.  Patrick Andrea OTR/L  Time Calculation: 22 mins

## 2022-03-18 VITALS
RESPIRATION RATE: 18 BRPM | HEIGHT: 60 IN | DIASTOLIC BLOOD PRESSURE: 54 MMHG | BODY MASS INDEX: 32.79 KG/M2 | WEIGHT: 167 LBS | SYSTOLIC BLOOD PRESSURE: 93 MMHG | OXYGEN SATURATION: 91 % | TEMPERATURE: 98.6 F | HEART RATE: 74 BPM

## 2022-03-18 LAB
ANION GAP SERPL CALC-SCNC: 1 MMOL/L (ref 5–15)
ARTERIAL PATENCY WRIST A: ABNORMAL
BASE EXCESS BLDA CALC-SCNC: 9.6 MMOL/L
BUN SERPL-MCNC: 19 MG/DL (ref 6–20)
BUN/CREAT SERPL: 48 (ref 12–20)
CALCIUM SERPL-MCNC: 8.5 MG/DL (ref 8.5–10.1)
CHLORIDE SERPL-SCNC: 93 MMOL/L (ref 97–108)
CO2 SERPL-SCNC: 42 MMOL/L (ref 21–32)
CREAT SERPL-MCNC: 0.4 MG/DL (ref 0.55–1.02)
GLUCOSE SERPL-MCNC: 107 MG/DL (ref 65–100)
HCO3 BLDA-SCNC: 37 MMOL/L (ref 22–26)
PCO2 BLDA: 62 MMHG (ref 35–45)
PH BLDA: 7.4 [PH] (ref 7.35–7.45)
PO2 BLDA: 45 MMHG (ref 80–100)
POTASSIUM SERPL-SCNC: 4.1 MMOL/L (ref 3.5–5.1)
SAO2 % BLD: 79 % (ref 92–97)
SAO2% DEVICE SAO2% SENSOR NAME: ABNORMAL
SERVICE CMNT-IMP: YES
SODIUM SERPL-SCNC: 136 MMOL/L (ref 136–145)
SPECIMEN SITE: ABNORMAL

## 2022-03-18 PROCEDURE — 74011250637 HC RX REV CODE- 250/637: Performed by: STUDENT IN AN ORGANIZED HEALTH CARE EDUCATION/TRAINING PROGRAM

## 2022-03-18 PROCEDURE — 36415 COLL VENOUS BLD VENIPUNCTURE: CPT

## 2022-03-18 PROCEDURE — 94760 N-INVAS EAR/PLS OXIMETRY 1: CPT

## 2022-03-18 PROCEDURE — 74011250637 HC RX REV CODE- 250/637: Performed by: GENERAL ACUTE CARE HOSPITAL

## 2022-03-18 PROCEDURE — 77010033678 HC OXYGEN DAILY

## 2022-03-18 PROCEDURE — 74011250637 HC RX REV CODE- 250/637: Performed by: NURSE PRACTITIONER

## 2022-03-18 PROCEDURE — 99233 SBSQ HOSP IP/OBS HIGH 50: CPT | Performed by: INTERNAL MEDICINE

## 2022-03-18 PROCEDURE — 80048 BASIC METABOLIC PNL TOTAL CA: CPT

## 2022-03-18 PROCEDURE — 74011000250 HC RX REV CODE- 250: Performed by: STUDENT IN AN ORGANIZED HEALTH CARE EDUCATION/TRAINING PROGRAM

## 2022-03-18 PROCEDURE — 82803 BLOOD GASES ANY COMBINATION: CPT

## 2022-03-18 PROCEDURE — 36600 WITHDRAWAL OF ARTERIAL BLOOD: CPT

## 2022-03-18 RX ORDER — CARVEDILOL 3.12 MG/1
3.12 TABLET ORAL 2 TIMES DAILY WITH MEALS
Qty: 60 TABLET | Refills: 2 | Status: SHIPPED | OUTPATIENT
Start: 2022-03-18 | End: 2022-04-17

## 2022-03-18 RX ORDER — FUROSEMIDE 40 MG/1
40 TABLET ORAL DAILY
Qty: 30 TABLET | Refills: 1 | Status: SHIPPED | OUTPATIENT
Start: 2022-03-18 | End: 2022-03-18 | Stop reason: SDUPTHER

## 2022-03-18 RX ORDER — POTASSIUM CHLORIDE 20 MEQ/1
20 TABLET, EXTENDED RELEASE ORAL DAILY
Qty: 30 TABLET | Refills: 0 | Status: SHIPPED | OUTPATIENT
Start: 2022-03-19 | End: 2022-03-18

## 2022-03-18 RX ORDER — FUROSEMIDE 20 MG/1
20 TABLET ORAL DAILY
Qty: 30 TABLET | Refills: 1 | Status: SHIPPED | OUTPATIENT
Start: 2022-03-18 | End: 2022-04-17

## 2022-03-18 RX ADMIN — CARVEDILOL 3.12 MG: 3.12 TABLET, FILM COATED ORAL at 09:17

## 2022-03-18 RX ADMIN — POTASSIUM CHLORIDE 20 MEQ: 20 TABLET, EXTENDED RELEASE ORAL at 09:17

## 2022-03-18 RX ADMIN — PANTOPRAZOLE SODIUM 40 MG: 40 TABLET, DELAYED RELEASE ORAL at 09:17

## 2022-03-18 RX ADMIN — SODIUM CHLORIDE, PRESERVATIVE FREE 10 ML: 5 INJECTION INTRAVENOUS at 06:33

## 2022-03-18 RX ADMIN — CASTOR OIL AND BALSAM, PERU: 788; 87 OINTMENT TOPICAL at 09:18

## 2022-03-18 RX ADMIN — APIXABAN 5 MG: 5 TABLET, FILM COATED ORAL at 09:17

## 2022-03-18 NOTE — PROGRESS NOTES
Pulse oximetry assessment   90%  at rest on room air   91% while ambulating on room air  96% at rest on 2L NC  98% while ambulating on 2L NC

## 2022-03-18 NOTE — DISCHARGE INSTRUCTIONS
Patient Follow Up Instructions: Activity: Activity as tolerated  Diet: ADULT DIET Regular  Wound Care: None needed  Follow-up with PCP in  1 week  Follow up with your cardiologist as scheduled   Follow-up tests/labs BMP in one week         My Medications      START taking these medications      Instructions Each Dose to Equal Morning Noon Evening Bedtime   apixaban 5 mg tablet  Commonly known as: ELIQUIS    Your last dose was: Your next dose is: Take 1 Tablet by mouth two (2) times a day for 30 days. 5 mg                 carvediloL 3.125 mg tablet  Commonly known as: COREG    Your last dose was: Your next dose is: Take 1 Tablet by mouth two (2) times daily (with meals) for 30 days. 3.125 mg                 furosemide 20 mg tablet  Commonly known as: Lasix    Your last dose was: Your next dose is: Take 1 Tablet by mouth daily for 30 days. 20 mg                    CONTINUE taking these medications      Instructions Each Dose to Equal Morning Noon Evening Bedtime   albuterol 90 mcg/actuation inhaler  Commonly known as: PROVENTIL HFA, VENTOLIN HFA, PROAIR HFA    Your last dose was: Your next dose is: Take 2 Puffs by inhalation every four (4) hours as needed for Wheezing. 2 Puff                 polyethylene glycol 17 gram/dose powder  Commonly known as: Miralax    Your last dose was: Your next dose is: Take 17 g by mouth two (2) times a day.  1 tablespoon with 8 oz of water daily  Indications: constipation   17 g                    STOP taking these medications    levoFLOXacin 500 mg tablet  Commonly known as: LEVAQUIN        predniSONE 10 mg dose pack  Commonly known as: STERAPRED DS              Where to Get Your Medications      These medications were sent to [facility] [id number], [address] AT [address], [address] [place] [id number]    Phone: [phone] · apixaban 5 mg tablet  · carvediloL 3.125 mg tablet  · furosemide 20 mg tablet             Patient Education   Patient Education   Patient Education   Patient Education      Patient Education      Atrial Fibrillation: Care Instructions  Your Care Instructions     Atrial fibrillation is an irregular and often fast heartbeat. Treating this condition is important for several reasons. It can cause blood clots, which can travel from your heart to your brain and cause a stroke. If you have a fast heartbeat, you may feel lightheaded, dizzy, and weak. An irregular heartbeat can also increase your risk for heart failure. Atrial fibrillation is often the result of another heart condition, such as high blood pressure or coronary artery disease. Making changes to improve your heart condition will help you stay healthy and active. Follow-up care is a key part of your treatment and safety. Be sure to make and go to all appointments, and call your doctor if you are having problems. It's also a good idea to know your test results and keep a list of the medicines you take. How can you care for yourself at home? Medicines    · Take your medicines exactly as prescribed. Call your doctor if you think you are having a problem with your medicine. You will get more details on the specific medicines your doctor prescribes.     · If your doctor has given you a blood thinner to prevent a stroke, be sure you get instructions about how to take your medicine safely. Blood thinners can cause serious bleeding problems.     · Do not take any vitamins, over-the-counter drugs, or herbal products without talking to your doctor first.   Lifestyle changes    · Do not smoke. Smoking can increase your chance of a stroke and heart attack. If you need help quitting, talk to your doctor about stop-smoking programs and medicines. These can increase your chances of quitting for good.     · Eat a heart-healthy diet.     · Stay at a healthy weight.  Lose weight if you need to.     · Limit alcohol to 2 drinks a day for men and 1 drink a day for women. Too much alcohol can cause health problems.     · Avoid colds and flu. Get a pneumococcal vaccine shot. If you have had one before, ask your doctor whether you need another dose. Get a flu shot every year. If you must be around people with colds or flu, wash your hands often. Activity    · If your doctor recommends it, get more exercise. Walking is a good choice. Bit by bit, increase the amount you walk every day. Try for at least 30 minutes on most days of the week. You also may want to swim, bike, or do other activities. Your doctor may suggest that you join a cardiac rehabilitation program so that you can have help increasing your physical activity safely.     · Start light exercise if your doctor says it is okay. Even a small amount will help you get stronger, have more energy, and manage stress. Walking is an easy way to get exercise. Start out by walking a little more than you did in the hospital. Gradually increase the amount you walk.     · When you exercise, watch for signs that your heart is working too hard. You are pushing too hard if you cannot talk while you are exercising. If you become short of breath or dizzy or have chest pain, sit down and rest immediately.     · Check your pulse regularly. Place two fingers on the artery at the palm side of your wrist, in line with your thumb. If your heartbeat seems uneven or fast, talk to your doctor. When should you call for help? Call 911 anytime you think you may need emergency care. For example, call if:    · You have symptoms of a heart attack. These may include:  ? Chest pain or pressure, or a strange feeling in the chest.  ? Sweating. ? Shortness of breath. ? Nausea or vomiting. ? Pain, pressure, or a strange feeling in the back, neck, jaw, or upper belly or in one or both shoulders or arms. ? Lightheadedness or sudden weakness.   ? A fast or irregular heartbeat. After you call 911, the  may tell you to chew 1 adult-strength or 2 to 4 low-dose aspirin. Wait for an ambulance. Do not try to drive yourself.     · You have symptoms of a stroke. These may include:  ? Sudden numbness, tingling, weakness, or loss of movement in your face, arm, or leg, especially on only one side of your body. ? Sudden vision changes. ? Sudden trouble speaking. ? Sudden confusion or trouble understanding simple statements. ? Sudden problems with walking or balance. ? A sudden, severe headache that is different from past headaches.     · You passed out (lost consciousness). Call your doctor now or seek immediate medical care if:    · You have new or increased shortness of breath.     · You feel dizzy or lightheaded, or you feel like you may faint.     · Your heart rate becomes irregular.     · You can feel your heart flutter in your chest or skip heartbeats. Tell your doctor if these symptoms are new or worse. Watch closely for changes in your health, and be sure to contact your doctor if you have any problems. Where can you learn more? Go to http://www.gray.com/  Enter U020 in the search box to learn more about \"Atrial Fibrillation: Care Instructions. \"  Current as of: January 10, 2022               Content Version: 13.2  © 2006-2022 [employer]. Care instructions adapted under license by [employer] (which disclaims liability or warranty for this information). If you have questions about a medical condition or this instruction, always ask your healthcare professional. [place] [address] any warranty or liability for your use of this information. Hiatal Hernia: Care Instructions  Your Care Instructions  A hiatal hernia occurs when part of the stomach bulges into the chest cavity. A hiatal hernia may allow stomach acid and juices to back up into the esophagus (acid reflux).  This can cause a feeling of burning, warmth, heat, or pain behind the breastbone. This feeling may often occur after you eat, soon after you lie down, or when you bend forward, and it may come and go. You also may have a sour taste in your mouth. These symptoms are commonly known as heartburn or reflux. But not all hiatal hernias cause symptoms. Follow-up care is a key part of your treatment and safety. Be sure to make and go to all appointments, and call your doctor if you are having problems. It's also a good idea to know your test results and keep a list of the medicines you take. How can you care for yourself at home? · Take your medicines exactly as prescribed. Call your doctor if you think you are having a problem with your medicine. · Do not take aspirin or other nonsteroidal anti-inflammatory drugs (NSAIDs), such as ibuprofen (Advil, Motrin) or naproxen (Aleve), unless your doctor says it is okay. Ask your doctor what you can take for pain. · Your doctor may recommend over-the-counter medicine. For mild or occasional indigestion, antacids such as Tums, Gaviscon, Maalox, or Mylanta may help. Your doctor also may recommend over-the-counter acid reducers, such as famotidine (Pepcid AC), cimetidine (Tagamet HB), or omeprazole (Prilosec). Read and follow all instructions on the label. If you use these medicines often, talk with your doctor. · Change your eating habits. ? It's best to eat several small meals instead of two or three large meals. ? After you eat, wait 2 to 3 hours before you lie down. Late-night snacks aren't a good idea. ? Avoid foods that make your symptoms worse. These may include chocolate, mint, alcohol, pepper, spicy foods, high-fat foods, or drinks with caffeine in them, such as tea, coffee, vera, or energy drinks. If your symptoms are worse after you eat a certain food, you may want to stop eating it to see if your symptoms get better.   · Do not smoke or chew tobacco.  · If you get heartburn at night, raise the head of your bed 6 to 8 inches by putting the frame on blocks or placing a foam wedge under the head of your mattress. (Adding extra pillows does not work.)  · Do not wear tight clothing around your middle. · Lose weight if you need to. Losing just 5 to 10 pounds can help. When should you call for help? Call your doctor now or seek immediate medical care if:    · You have new or worse belly pain.     · You are vomiting. Watch closely for changes in your health, and be sure to contact your doctor if:    · You have new or worse symptoms of indigestion.     · You have trouble or pain swallowing.     · You are losing weight.     · You do not get better as expected. Where can you learn more? Go to http://www.simpson.com/  Enter T074 in the search box to learn more about \"Hiatal Hernia: Care Instructions. \"  Current as of: September 8, 2021               Content Version: 13.2  © 2006-2022 [employer]. Care instructions adapted under license by [facility] [employer] [facility] (which disclaims liability or warranty for this information). If you have questions about a medical condition or this instruction, always ask your healthcare professional. [place] [address] any warranty or liability for your use of this information. Medicines for Heart Failure: Care Instructions  Your Care Instructions     Most people with heart failure are helped by taking several medicines to protect their heart. These medicines can help you feel better and live longer. It is important to take all your medicines exactly as prescribed to get the best results. They can also cause side effects. If you think that any of your medicines are causing side effects, talk with your doctor. Follow-up care is a key part of your treatment and safety. Be sure to make and go to all appointments. Call your doctor if you are having problems.  It's also a good idea to know your test results and keep a list of the medicines you take. What medicines are used for heart failure? · Aldosterone receptor antagonists. These are a type of diuretic. They make the kidneys get rid of extra fluid. · Angiotensin-converting enzyme (ACE) inhibitors help blood flow. They relax the blood vessels and lower your blood pressure. The heart can then pump more blood through your body without working harder. · Angiotensin II receptor blockers (ARBs) work like ACE inhibitors. Some people use them instead. · Angiotensin receptor neprilysin inhibitor (ARNI) medicine works like ARBs and ACE inhibitors. Some people take an ARNI medicine instead. · Beta-blockers can slow the heart rate and decrease blood pressure. · Digoxin relieves symptoms in some people with heart failure. · Diuretics reduce swelling. They do this by helping the kidneys get rid of excess fluid. They are also called water pills. · Hydralazine. This may be taken with a nitrate to widen blood vessels. It can lower blood pressure and reduce the workload on the heart. · Ivabradine slows the heart rate. · SGLT2 inhibitors help remove extra glucose through the urine. What should you know about these medicines? This is not a complete list of medicines used for heart failure. If you have questions about your medicine, ask your doctor or pharmacist.  ACE inhibitors  Before you start to take an ACE inhibitor, talk to your doctor. Tell him or her if:  · You take any anti-inflammatory medicines. These include ibuprofen (Advil, Motrin) and naproxen (Aleve). · You take antacids, potassium pills or a salt substitute, or lithium. · You take a diuretic. · You are pregnant or breastfeeding or you plan to get pregnant. · You have kidney disease. Side effects include:  · A dry cough. If the cough is bad enough to make you stop the medicine, talk to your doctor. You may need to take a different one. · Feeling lightheaded.  This may happen when you stand up too fast. It usually gets better with time. Angiotensin II receptor blockers (ARBs)  Before you start to take an ARB, talk to your doctor. Tell him or her if:  · You take any anti-inflammatory medicines. These include ibuprofen (Advil, Motrin) and naproxen (Aleve). · You take antacids, potassium pills or a salt substitute, or lithium. · You have kidney disease. · You take a diuretic. · You are pregnant or breastfeeding or you plan to get pregnant. Side effects include:  · Feeling lightheaded or dizzy. These are the most common side effects. Angiotensin receptor neprilysin inhibitor (ARNI)  Before you start to take an ARNI, talk to your doctor. Tell him or her if:  · You take any anti-inflammatory medicines. These include ibuprofen (Advil, Motrin) and naproxen (Aleve). · You take antacids, potassium pills or a salt substitute, or lithium. · You take an ACE inhibitor or an ARB. · You have kidney or liver problems. · You take a diuretic. · You are pregnant or breastfeeding or you plan to get pregnant. Side effects include:  · Feeling lightheaded or dizzy. These are the most common side effects. Diuretics (water pills)  Before you start to take a diuretic, talk to your doctor. Tell him or her if:  · You take lithium or anti-inflammatory medicines such as Advil or Aleve. Side effects include:  · Urinating often. Ask your doctor about timing these pills so that you don't have to use the bathroom at a bad time. · Muscle cramps. This may mean that you are losing too much potassium. It is an important mineral. Call your doctor if you get muscle cramps. · Tender breasts. This may occur in men who take spironolactone. Call your doctor if you get tender breasts that bother you. Beta-blockers  Before you start to take a beta-blocker, talk to your doctor. Tell him or her if:  · You have asthma or diabetes. Side effects include:  · Feeling dizzy or tired. This usually gets better with time.   Digoxin  Before you start to take digoxin, talk to your doctor. Tell him or her if:  · You have kidney disease. · You take a diuretic or other medicines. This includes over-the-counter medicines. Side effects include:  · Nausea or vomiting. · Confusion, changes in vision, a racing or slowed heartbeat, or dizziness. Call your doctor right away if you have any of these side effects. Where can you learn more? Go to http://www.gray.com/  Enter F132 in the search box to learn more about \"Medicines for Heart Failure: Care Instructions. \"  Current as of: January 10, 2022               Content Version: 13.2  © 5660-6200 [employer]. Care instructions adapted under license by [employer] (which disclaims liability or warranty for this information). If you have questions about a medical condition or this instruction, always ask your healthcare professional. [place] [address] any warranty or liability for your use of this information. Learning About Heart Failure  What is heart failure? Heart failure means that your heart muscle doesn't pump as much blood as your body needs. Failure doesn't mean that your heart has stopped. It means that your heart isn't pumping as well as it should. Because your heart cannot pump well, your body tries to make up for it. To do this:  · Your body holds on to salt and water. This increases the amount of blood in your bloodstream.  · Your heart beats faster. · Your heart might get bigger. Your body has an amazing ability to make up for heart failure. It may do such a good job that you don't know you have a disease. But at some point, your heart and body will no longer be able to keep up. Then fluid starts to build up in your lungs and other parts of your body. This fluid buildup is called congestion. It's why some doctors call the disease congestive heart failure. What can you expect when you have heart failure?   Heart failure is a lifelong (chronic) disease. Treatment may be able to slow the disease and help you feel better. But heart failure tends to get worse over time. Despite this, there are many steps you can take to feel better and stay healthy longer. Early on, your symptoms may not be too bad. As heart failure gets worse, symptoms typically get worse, and you may need to limit your activities. Heart failure can also get worse suddenly. If this happens, you need emergency care. Then, after treatment, your symptoms may go back to being stable (which means they stay the same) for a long time. Heart failure can lead to other health problems, such as heart rhythm problems. Over time, your treatment options may change, especially as your symptoms get worse. You may want to think about what kind of care you want at the end of your life. What are the symptoms? Symptoms of heart failure start to happen when your heart can't pump enough blood to the rest of your body. In the early stages of heart failure, you may:  · Feel tired easily. · Be short of breath when you exert yourself. · Feel like your heart is pounding or racing (palpitations). · Feel weak or dizzy. As heart failure gets worse, fluid starts to build up in your lungs and other parts of your body. This may cause you to:  · Feel short of breath even at rest.  · Have swelling (edema), especially in your legs, ankles, and feet. · Gain weight. This may happen over just a day or two, or more slowly. · Cough or wheeze, especially when you lie down. · Feel bloated or sick to your stomach. How is heart failure treated? Heart failure is treated with medicines and steps you take to make lifestyle changes and check your symptoms. Treatment can slow the disease and help you feel better and live longer. · You'll probably take several medicines. · You'll take steps to care for yourself at home. [place] watch for changes in your symptoms.  You may need to make lifestyle changes, such as limiting sodium, getting regular exercise, not smoking, and eating healthy foods. · You might attend cardiac rehabilitation (rehab) to get education and support that help you make lifestyle changes and stay as healthy as possible. · You may get a heart device. A pacemaker helps your heart pump blood. An ICD can stop abnormal heart rhythms. · As heart failure gets worse, palliative care can help improve your quality of life. You can do advance care planning to decide what kind of care you want at the end of your life. How can you care for yourself? There are many steps you can take to feel better and live longer. They can help you stay active and enjoy life. Take your medicine the right way. Avoid medicines that can make your symptoms worse. Check your weight and symptoms every day. Know what to do if your symptoms get worse. Limit sodium. This helps keep fluid from building up. It may help you feel better. Be active. Exercise regularly, but don't exercise too hard. Be heart-healthy. Eat healthy foods, stay at a healthy weight, limit alcohol, and don't smoke. Stay as healthy as possible. Manage other health problems such as diabetes and high blood pressure. Avoid colds and flu, get help for depression and anxiety, and manage stress. If you think you may have a problem with alcohol or drug use, talk to your doctor. Ask your doctor if you need to limit the amount of fluids you drink. Follow-up care is a key part of your treatment and safety. Be sure to make and go to all appointments, and call your doctor if you are having problems. It's also a good idea to know your test results and keep a list of the medicines you take. Where can you learn more? Go to http://www.gray.com/  Enter [id number] in the search box to learn more about \"Learning About Heart Failure. \"  Current as of: January 10, 2022               Content Version: 13.2  © 5317-8664 [employer]. Care instructions adapted under license by [employer] (which disclaims liability or warranty for this information). If you have questions about a medical condition or this instruction, always ask your healthcare professional. [address] any warranty or liability for your use of this information. Advance Care Planning for Heart Failure: Care Instructions  Your Care Instructions     If you have heart failure, taking care of yourself will help you feel better and live longer. But the disease often gets worse over time. So it may be a good idea to plan for the future now while you are active and able to communicate your wishes. If you do this kind of planning, it does not mean that you are giving up. It's simply the best way to make sure that you get the care and treatment that you want. It can also make things much easier for your loved ones. What can you do to plan for the end of life? · Talk openly and honestly with your family and doctor. This is the best way to understand the decisions you will need to make as your health changes. Know that you can always change your mind. · Ask your doctor about common life-support treatments. These include tube feedings, breathing machines, and fluids given through a vein (IV). It may be easier to decide if you want them after you are sure you understand what they are and how they may help you. · Think about preparing written papers that state your wishes. These papers are called advance directives. If you do this early and review them often, there will not be any confusion about your wishes. You can change your instructions at any time. · If you are near the end of your life and you have a heart device such as an implantable cardioverter defibrillator (ICD) or pacemaker, talk to your doctor about turning it off. Include this in your advance directive.   · Ask a friend or family member to make decisions for you when you no longer can. Choose someone who knows you well and understands what makes life meaningful for you. Talk to this person about the kinds of treatments you want or don't want. Make sure this person understands your values and wishes. · You may decide to try life-supporting treatments for a limited time. This can help your doctor see if they will help. You may also decide that you want your doctor to do only certain things to keep you alive. It may help to think about the big picture, like what makes life worth living for you or what your values and goals are. · Ask your doctor for an estimate of how long you may live. Your doctor may not always know. But your doctor can tell you what usually happens with heart failure. Which papers should you prepare? If you have heart failure, you may find it hard to think about a time when you might not be able to care for yourself. But since heart failure tends to get worse over time, it's important to make a plan for the care you want later. An advance directive can help you do this. Advance directives are legal papers that tell doctors how to care for you at the end of your life. They may include a living will and a durable power of . You don't need a  to write these papers. If you prepare these papers, be sure to give a copy to your doctor. It's also important to give a copy to a family member or close friend. · Consider a do-not-resuscitate order (DNR). This order asks that no extra treatments be done if your heart stops or you stop breathing. Extra treatments may include cardiopulmonary resuscitation (CPR), electrical shock to restart your heart, or a machine to breathe for you. If you decide to have a DNR order, ask your doctor to explain and write it. Place the order in your home where everyone can easily see it. · Think about a living will. It explains your wishes in case you are in a coma or can't communicate.  Living gar tell doctors to use or not use treatments to keep you alive. You must have one or two witnesses or a notary in the room when you sign this form. · Think about naming a person to make decisions about your care if you aren't able to. This paper is called a durable power of . Most people ask a close friend or family member. · Ask your doctor or your state health department about advance directives. They may have forms for each of these types of papers. · All of these papers are simple to change. Tell your doctor what you want to change. Ask him or her to make a note in your file. Then give your family updated copies. Where can you learn more? Go to http://www.gray.com/  Enter L823 in the search box to learn more about \"Advance Care Planning for Heart Failure: Care Instructions. \"  Current as of: January 10, 2022               Content Version: 13.2  © 2006-2022 [employer]. Care instructions adapted under license by [employer] (which disclaims liability or warranty for this information). If you have questions about a medical condition or this instruction, always ask your healthcare professional. [place] [address] any warranty or liability for your use of this information.

## 2022-03-18 NOTE — PROGRESS NOTES
[address] [place] [address]  [phone]      Cardiology Progress Note      3/18/2022 1011 AM    Admit Date: 3/14/2022    Admit Diagnosis:   CHF exacerbation ([address] [I50.9]  Elevated troponin [R77.8]  Hyponatremia [E87.1]    Subjective:     Carol Urena has no cardiac complaints, she is sitting up eating breakfast. States \"I can't talk and eat at the same time, I choke on my food, Dr. Slick Carter was here this morning, he said I'm doing ok, I feel ok\".       Incomplete I/O, roughly negative 2.4L  VSS, continue low dose BB   Now NSR with 1 degree AVB frequent PVC's    Visit Vitals  /71   Pulse 77   Temp 97.8 °F (36.6 °C)   Resp 18   Ht 5' (1.524 m)   Wt 75.8 kg (167 lb)   SpO2 96%   BMI 32.61 kg/m²       Current Facility-Administered Medications   Medication Dose Route Frequency    [Held by provider] furosemide (LASIX) injection 40 mg  40 mg IntraVENous DAILY    potassium chloride (K-DUR, KLOR-CON M20) SR tablet 20 mEq  20 mEq Oral DAILY    balsam peru-castor oiL (VENELEX) ointment   Topical BID    balsam peru-castor oiL (VENELEX) ointment   Topical BID    traMADoL (ULTRAM) tablet 100 mg  100 mg Oral Q6H PRN    carvediloL (COREG) tablet 3.125 mg  3.125 mg Oral BID WITH MEALS    apixaban (ELIQUIS) tablet 5 mg  5 mg Oral BID    acetaminophen (TYLENOL) tablet 650 mg  650 mg Oral Q6H PRN    ondansetron (ZOFRAN) injection 4 mg  4 mg IntraVENous Q4H PRN    pantoprazole (PROTONIX) tablet 40 mg  40 mg Oral ACB&D    sodium chloride (NS) flush 5-40 mL  5-40 mL IntraVENous Q8H    sodium chloride (NS) flush 5-40 mL  5-40 mL IntraVENous PRN    polyethylene glycol (MIRALAX) packet 17 g  17 g Oral DAILY PRN       Objective:      Physical Assessment:   General Appearance:  alert, cooperative, well nourished, well developed; obese,  female, appears stated age, in NAD  Eyes: sclera anicteric  Mouth/Throat: moist mucous membranes; oral pharynx clear  Neck: supple; no JVD or bruit  Pulmonary:  rales to auscultation bilaterally; good effort  Cardiovascular: irregular rate and rhythm; no murmur, click, rub, or gallop  Abdomen: soft, non-tender, non-distended; bowel sounds normal  Musculoskeletal: no swelling or deformity; moves all extremities  Extremities: 1+ edema; palpable distal pulses   Skin: warm to touch bilateral legs, left leg wrapped, weeping, tender   Neuro: grossly normal, [employer]   Psych: normal mood and affect given the setting      Data Review:   No results for input(s): WBC, HGB, HCT, PLT, HGBEXT, HCTEXT, PLTEXT, HGBEXT, HCTEXT, PLTEXT in the last 72 hours. Recent Labs     03/18/22  0356 03/17/22  0547    137   K 4.1 3.3*   CL 93* 92*   CO2 42* 42*   * 118*   BUN 19 20   CREA 0.40* 0.49*   CA 8.5 8.3*       No results for input(s): TROPHS, CPK, CKMB in the last 72 hours. No intake or output data in the 24 hours ending 03/18/22 0952     Cardiographics     Telemetry: NSR 1 degree, frequent PVC's, bigeminy   ECG: Afib   Echocardiogram:  3/15/2022    Left Ventricle: Left ventricle is moderately dilated. Normal wall thickness. Global hypokinesis present. Severely reduced left ventricular systolic function with a visually estimated EF of 20 - 25%.   Left Atrium: Left atrium is moderately dilated.   Right Ventricle: Right ventricle is moderately dilated. Mildly reduced systolic function.   Right Atrium: Right atrium is moderately dilated.   Mitral Valve: Moderate to severe transvalvular regurgitation. MV EROA by continuity equation is 0.4 cm2.   Tricuspid Valve: Mild transvalvular regurgitation. CXRAY: \"Mild pulmonary edema pattern. Redemonstrated large hiatal hernia. \"          Assessment:     Principal Problem:    CHF exacerbation ([address] (3/14/2022)    Active Problems:    Hyponatremia (3/14/2022)      Elevated troponin (3/14/2022)      HLD (hyperlipidemia) (3/15/2022)      HTN (hypertension) (3/15/2022)      Abdominal pain (3/15/2022)      Shortness of breath (3/15/2022)        Plan:   Sarika Zaragoza is an 80 y.o. female with a PMHx significant for Arthritis, Hiatal Hernia, PAF, HTN, Chronic edema/cellulitis admitted for CHF exacerbation ([facility] [address] [I50.9] Elevated troponin [R77.8] Hyponatremia [E87.1]. CHF exacerbation: improved  History of A. Fib: Currently back in NSR with 1 degree AVB   NT pro-BNP 9k  CXray showed mild pulmonary edema   Patient received IV lasix 40 mg in ED  Reduce Lasix 40 mg IV daily-will switch to 40 mg PO daily at discharge   Documented negative 2.4 L, Iikely incomplete;some episodes of urinary incontinence   Strict I/O, labs, daily weights  ECHO read as above, EF 20-25%  Patient is not a candidate for LifeVest, as I do not believe she will have the manual dexterity to respond to alarms which could be false alarms. Therefore she is at excessively high risk for defibrillation due to a false alarm. Tolerating low dose BB  Will hold off on adding ACEi/ARB/ARNI until outpatient due to borderline low BPs  EKG shows rate controlled Afib with frequent PVCs  Please keep K at 4, Mg at 2  K 4.1 this am  Atrial Fibrillation CHADSVASC2 Score Stroke Risk:   80 y.o. > 76        +2    female Female +1   CHF HX: Yes    +1   HTN HX: Yes    +1   Stroke/TIA/Thromboembolism No    +0   Vascular Disease HX: No    + 0   Diabetes Mellitus No    + 0   CHADSVASC 2 Score 5      Annual Stroke Risk 7.2% - moderate-high       Discussed long term anticoagulation with Eliquis. Continue. Elevated troponin:  Patient reports no ACS symptoms  Troponin flat, non-specific, 65 x 2. ECHO EF 20-25%, long standing Afib likely    Monitor telemetry   Patient is set up for stress test on 3/31. Patient cardiologist is Dr. Taurus Mclaughlin  Continue on low dose BB      Large hiatal hernia  Epigastric pain  Continue Protonix  40 mg twice daily.   GI consulted, noted results of modified barium shows some aspiration     History of hypertension:   Controlled  Continue lasix as prescribed Continue on low dose BB     She will need to follow up closely with Dr. Muna Phan as outpatient. Juni Morrow, NP   DNP,APRN,AG-ACNP-BC    Patient seen and examined by me with the above nurse practitioner. I personally performed all components of the history, physical, and medical decision making and agree with the assessment and plan with minor modifications as noted. Today the patient presents with cardiomyopathy now well compensated, tolerating Eliquis, back in sinus rhythm. General PE  Gen:  NAD  Mental Status - Alert. General Appearance - Not in acute distress. HEENT:  PERRL, no carotid bruits or JVD  Chest and Lung Exam   Inspection: Accessory muscles - No use of accessory muscles in breathing. Auscultation:   Breath sounds: - Normal.   Cardiovascular   Inspection: Jugular vein - Bilateral - Inspection Normal.   Palpation/Percussion:   Apical Impulse: - Normal.   Auscultation: Rhythm - Regular with ectopics. Heart Sounds - S1 WNL and S2 WNL. No S3 or S4. Murmurs & Other Heart Sounds: Auscultation of the heart reveals - No Murmurs. Peripheral Vascular   Upper Extremity: Inspection - Bilateral - No Cyanotic nailbeds or Digital clubbing. Lower Extremity:   Palpation: Edema - Bilateral - No edema. Abdomen:   Soft, non-tender, bowel sounds are active. Neuro: A&O times 3, CN and motor grossly WNL    Okay for discharge from a cardiac standpoint with the addition of Lasix, beta-blocker, and Eliquis. BP is too low for ACE inhibitor/ARB/ARNI at this time. Follow up with Dr. Muna Phan as planned. Thanks for the consult. I will sign off for now. Please call if any questions or concerns.

## 2022-03-18 NOTE — PROGRESS NOTES
Spiritual Care Assessment/Progress Note  [facility]      NAME: Deyvi Pinedo      MRN: [id number]  AGE: 80 y.o. SEX: female  Mandaen Affiliation: Latter day   Language: English     3/18/2022     Total Time (in minutes): 5     Spiritual Assessment begun in [facility] 2 MED TELE through conversation with:         []Patient        [] Family    [] Friend(s)        Reason for Consult: Initial/Spiritual assessment, patient floor     Spiritual beliefs: (Please include comment if needed)     [] Identifies with a tanvir tradition:         [] Supported by a tanvir community:            [] Claims no spiritual orientation:           [] Seeking spiritual identity:                [] Adheres to an individual form of spirituality:           [x] Not able to assess:                           Identified resources for coping:      [] Prayer                               [] Music                  [] Guided Imagery     [] Family/friends                 [] Pet visits     [] Devotional reading                         [x] Unknown     [] Other:                                               Interventions offered during this visit: (See comments for more details)    Patient Interventions: Initial visit           Plan of Care:     [] Support spiritual and/or cultural needs    [] Support AMD and/or advance care planning process      [] Support grieving process   [] Coordinate Rites and/or Rituals    [] Coordination with community clergy   [] No spiritual needs identified at this time   [] Detailed Plan of Care below (See Comments)  [] Make referral to Music Therapy  [] Make referral to Pet Therapy     [] Make referral to Addiction services  [] Make referral to [facility]  [] Make referral to Spiritual Care Partner  [] No future visits requested        [x] Contact Spiritual Care for further referrals     Comments:     Initial Spiritual Care Assessment visit for Ms. Urena in 2132.  Reviewed the patient's chart notes before the visit. Upon arrival, patient was actively receiving medical care by the medical staff.  respected patient's treatment session. Contact  if further needs arise.       [id number] [facility] Mehul Franco,[place], [place] [address] Provider   Paging Service [phone])

## 2022-03-18 NOTE — PROGRESS NOTES
Ready from CM standpoint. CM has completed SMAART tool & placed on pt's door for RN to complete. Transition of Care Plan:     RUR:   12% \"low risk\"  Disposition: Home with follow-up appts & caregivers  Follow up appointments: PCP, Cardiology- details in AVS  DME needed: TBD- has w/c & 3-in-1 commode  Transportation at Discharge: Family/ caregiver; present at bedside  [place] or means to access home: Yes  IM Medicare Letter: Reviewed  Is patient a BCPI-A Bundle: No              If yes, was Bundle Letter given?:  N/A  Is patient a  and connected with the [place]? No              If yes, was [employer] transfer form completed and VA notified? N/A  Caregiver Contact: POA/ Nephew- [address] [facility], [phone]  Discharge Caregiver contacted prior to discharge? CM acknowledged discharge. Update 2:45 PM  Met with pt & her caregiver Kaminskijarod Umaña) at bedside to review [facility] plan for discharge re: home with follow-up appts. Pt/ cg verbalized understanding & are agreeable to discharge. Report no questions or concerns. Caregiver present at bedside to transport pt home. No further needs. Initial note-  Discharge plan discussed during IDR: pt anticipated to discharge home today. RN to complete 02 challenge; will order home 02 if needed. CM met with pt & her nephew/ caregiver at bedside to review plan for discharge re: home with follow-up appts. Pt/ nephew verbalized understanding. CM checked cost of Eliquis; pt has a deductible >$500 per pharmacist. CM provided pt with $10 co-pay & free 30 day trial discount cards. Pt has stress test with her Cardioogist (Dr. Car Dey) on 3/31 & a follow-up on 4/15. CM added information to AVS for pt. Will follow. Care Management Interventions  PCP Verified by CM: Yes  Mode of Transport at Discharge:  Other (see comment) (Carefiver/ family)  Transition of Care Consult (CM Consult): Discharge Planning  Discharge Durable Medical Equipment: No  Physical Therapy Consult: No  Occupational Therapy Consult: No  Speech Therapy Consult: No  Support Systems: Other Family Member(s)  Confirm Follow Up Transport: Family  The Plan for Transition of Care is Related to the Following Treatment Goals : Home with [facility] & follow-up appts  The Patient and/or Patient Representative was Provided with a Choice of Provider and Agrees with the Discharge Plan?: Yes  Name of the Patient Representative Who was Provided with a Choice of Provider and Agrees with the Discharge Plan: Nephew/ POA- [facility]  Discharge Location  Patient Expects to be Discharged to[de-identified] [facility], MSW  Care Management

## 2022-03-18 NOTE — PROGRESS NOTES
Hospitalist Progress Note    NAME: Carol Urena   :  1933   MRN:  [id number]       Assessment / Plan:    Acute systolic HF, new diagnosis   Elevated troponin  Mod to sev MV regurge   Dilated CMP  Troponin65 and repeat 65 too. BNP 9117. Continue  40 mg IV twice daily.    Echo w EF of 20-25 %  Cardiology consulted, recs CV in 4-6 weeks  -start low dose BB  -avoid ACE-I/ARB given low BP  Patient is set up for stress test on 3/31.  Patient cardiologist is Dr. Yusra Ha and output, daily weights. --as per Cardio, pt is \"not a candidate for LifeVest, as I do not believe she will have the manual dexterity to respond to alarms\"  --TSH wnl    History of A. Fib  chadsvasc 5  Started on Eliquis, check coverage prior to DC  Started on BB  Cardiology consulted, recs CV in 4-6 weeks     Large hiatal hernia  Aspiration on barium esophagogram   Nonobstructive organoaxial gastric volvulus, not significantly changed from    Epigastric pain  GERD  continue Protonix to 40 mg twice daily. GI consulted, recs that [facility] repair is \"not be a great option unless she develops ongoing chest pain with eating or vomiting which could indicate gastric volvulus\"  --Gen Sx recs no interventions given pt age and comorbidity    --SLP recs regular / thins liquids          30.0 - 39.9 Obese / Body mass index is 33.2 kg/m². Estimated discharge date: 48hrs  Code status: Full  Prophylaxis: eliquis   Recommended Disposition: Home w/Family. Pt is wheelchair bound, can get from bed to wheelchair on her own. Anticipated Discharge Date:  24-48 hours        Subjective:     Discussed with RN events overnight.    Cough on eating  No CP or abd pain any more  Afebrile  No cough    Review of Systems:  Symptom Y/N Comments  Symptom Y/N Comments   Fever/Chills    Chest Pain     Poor Appetite    Edema     Cough    Abdominal Pain Sputum    Joint Pain     SOB/MARKS    Pruritis/Rash     Nausea/vomit    Tolerating PT/OT     Diarrhea    Tolerating Diet     Constipation    Other       PO intake: No data found. Wt Readings from Last 10 Encounters:   03/17/22 75.8 kg (167 lb)   10/15/19 77.1 kg (170 lb)       Objective:     VITALS:   Last 24hrs VS reviewed since prior progress note. Most recent are:  Patient Vitals for the past 24 hrs:   Temp Pulse Resp BP SpO2   03/17/22 2020 98.6 °F (37 °C) 89 16 103/78 95 %   03/17/22 1602 97.6 °F (36.4 °C) 87 16 101/68 94 %   03/17/22 1035  86  (!) 108/92    03/17/22 0939    100/72    03/17/22 0833 97.2 °F (36.2 °C) 68 18 94/68 100 %   03/17/22 0246 97.9 °F (36.6 °C) (!) 48 17 101/61 94 %     No intake or output data in the 24 hours ending 03/17/22 3966     I had a face to face encounter, and independently examined this patient on 3/17/2022, as outlined below:    PHYSICAL EXAM:  General:    No distress     HEENT: Atraumatic, anicteric sclerae, pink conjunctivae, MMM  Neck:  Supple, symmetrical  Lungs:   CTA. No Wheezing/Rhonchi. No rales. No tenderness. No Accessory muscle use. Heart:   Regular rhythm. No murmur. No JVD   GI/:   Soft. NT. ND. BS normal  Extremities: No edema. No cyanosis. No clubbing. Skin:     Not pale. Not Jaundiced. No rashes   Psych:  Good insight. Not depressed. Not anxious or agitated. Neurologic: Alert and oriented X 4. EOMs intact. No facial asymmetry. No slurred speech. Symmetrical strength, Sensation grossly intact. Labs     I reviewed today's most current labs and imaging studies. Pertinent labs include:  Recent Labs     03/15/22  0242   WBC 7.1   HGB 12.7   HCT 39.8        Recent Labs     03/17/22  0547 03/15/22  0242    136   K 3.3* 4.1   CL 92* 97   CO2 42* 36*   * 91   BUN 20 26*   CREA 0.49* 0.65   CA 8.3* 8.5   MG  --  2.1     XR BA SWALLOW ESOPHOGRAM    Result Date: 3/16/2022  1.  Large paraesophageal hernia, with nearly the entire stomach in the chest. 2. Nonobstructive organoaxial gastric volvulus, not significantly changed from 2020. 3. Slow progression of oral contrast through the esophagus and stomach. Esophagoesophageal reflux associated. 4. Aspiration, with weak cough. Residual contrast in the vallecula and piriforms after the swallow. Air kerma (radiation dose): 79.3 mGy      No results found. 03/14/22    ECHO ADULT COMPLETE 03/16/2022 3/16/2022    Interpretation Summary    Left Ventricle: Left ventricle is moderately dilated. Normal wall thickness. Global hypokinesis present. Severely reduced left ventricular systolic function with a visually estimated EF of 20 - 25%.   Left Atrium: Left atrium is moderately dilated.   Right Ventricle: Right ventricle is moderately dilated. Mildly reduced systolic function.   Right Atrium: Right atrium is moderately dilated.   Mitral Valve: Moderate to severe transvalvular regurgitation. MV EROA by continuity equation is 0.4 cm2.   Tricuspid Valve: Mild transvalvular regurgitation. Signed by:  Marietta Randle MD on 3/16/2022 11:13 AM       Current Medications:     Current Facility-Administered Medications:     furosemide (LASIX) injection 40 mg, 40 mg, IntraVENous, DAILY, Casandra Inches L, NP, 40 mg at 03/17/22 1035    [START ON 3/18/2022] potassium chloride (K-DUR, KLOR-CON M20) SR tablet 20 mEq, 20 mEq, Oral, DAILY, Al-Dabbagh, Gildardo Guille, MD    balsam peru-castor oiL (VENELEX) ointment, , Topical, BID, Al-Dabbagh, Gildardo Guille, MD    balsam peru-castor oiL (VENELEX) ointment, , Topical, BID, Gildardo Oconnell MD    traMADoL (ULTRAM) tablet 100 mg, 100 mg, Oral, Q6H PRN, Joseline Oconnell MD    carvediloL (COREG) tablet 3.125 mg, 3.125 mg, Oral, BID WITH MEALS, Casandra Inches L, NP, 3.125 mg at 03/17/22 0932    apixaban (ELIQUIS) tablet 5 mg, 5 mg, Oral, BID, Casandra Inches L, NP, 5 mg at 03/17/22 1810    acetaminophen (TYLENOL) tablet 650 mg, 650 mg, Oral, Q6H PRN, Anastasia Lopez MD, 650 mg at 03/17/22 1838    ondansetron [facility]) injection 4 mg, 4 mg, IntraVENous, Q4H PRN, Anastasia Lopez MD    pantoprazole (PROTONIX) tablet 40 mg, 40 mg, Oral, ACB&D, Madison Blanchard MD, 40 mg at 03/17/22 1810    sodium chloride (NS) flush 5-40 mL, 5-40 mL, IntraVENous, Q8H, Madison Blanchard MD, 10 mL at 03/17/22 1442    sodium chloride (NS) flush 5-40 mL, 5-40 mL, IntraVENous, PRN, Madison Blanchard MD    polyethylene glycol (MIRALAX) packet 17 g, 17 g, Oral, DAILY PRN, Madison Blanchard MD     Procedures: see electronic medical records for all procedures/Xrays and details which were not copied into this note but were reviewed prior to creation of Plan. Reviewed most current lab test results and cultures  YES  Reviewed most current radiology test results   YES  Review and summation of old records today    NO  Reviewed patient's current orders and MAR    YES  PMH/ reviewed - no change compared to H&P  ________________________________________________________________________  Care Plan discussed with:    Comments   Patient x    Family      RN x    Care Manager x    Consultant                       x Multidiciplinary team rounds were held today with , nursing, pharmacist and clinical coordinator. Patient's plan of care was discussed; medications were reviewed and discharge planning was addressed.      ________________________________________________________________________  Total NON critical care TIME:  31   Minutes    Total CRITICAL CARE TIME Spent:   Minutes non procedure based      Comments   >50% of visit spent in counseling and coordination of care x     This includes time during multidisciplinary rounds if indicated above   ________________________________________________________________________  [facility], MD

## 2022-03-18 NOTE — DISCHARGE SUMMARY
Hospitalist Discharge Summary     Patient ID:  Cuco Irizarry  [id number]  80 y.o.  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  female who presents with epigastric pain and shortness of breath. Patient past medical history of CHF, hiatal hernia, hypertension, A. fib. Patient states that she is having shortness of breath over the last few months and is following up with Dr. Brianna Patel who has set up for the stress test on 3/31. Patient also complains of epigastric pain which started today, has this pain before also and has large hiatal hernia but patient never wants to get operated considering the comorbidities and the risk. Denies any nausea vomiting, no chest pain, no headache, no dizziness, no fever and chills, no cough, no other complaints.  ____________________________________________________________________  DISCHARGE SUMMARY/HOSPITAL COURSE:  for full details see H&P, daily progress notes, labs, consult notes. 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 BNP 9117.   Continue  40 mg IV twice daily, switch to lasix 20 mg po daily on DC given soft BP  Echo w EF of 20-25 %  -start low dose BB  -avoid ACE-I/ARB given low BP  Patient is set up for stress test on 3/31.  Patient cardiologist is Dr. Brianna Patel  --as per Cardio, pt is \"not a candidate for LifeVest, as I do not believe she will have the manual dexterity to respond to alarms\"  --TSH wnl     A. Fib  chadsvasc 5  Started on Eliquis, check coverage prior to DC  Started on BB  Converted to NSR     Large hiatal hernia  Aspiration on barium esophagogram   Nonobstructive organoaxial gastric volvulus, not significantly changed from  2020  Epigastric pain  GERD  continue Protonix to 40 mg twice daily. GI consulted, recs that [facility] repair is \"not be a great option unless she develops ongoing chest pain with eating or vomiting which could indicate gastric volvulus\"  --Gen Sx recs no interventions given pt age and comorbidity    --SLP recs regular / thins liquids       POC d/w pt and nephew, all question/concerns addressed and they verbalized understanding.  _______________________________________________________________________  Patient seen and examined by me on discharge day. Pertinent Findings:  Gen:    Not in distress  Chest: Clear lungs  CVS:   Regular rhythm. No edema  Abd:  Soft, not distended, not tender  Neuro:  AAOX3  _______________________________________________________________________  DISCHARGE MEDICATIONS:   Current Discharge Medication List      START taking these medications    Details   apixaban (ELIQUIS) 5 mg tablet Take 1 Tablet by mouth two (2) times a day for 30 days. Qty: 60 Tablet, Refills: 1  Start date: 3/18/2022, End date: 4/17/2022      carvediloL (COREG) 3.125 mg tablet Take 1 Tablet by mouth two (2) times daily (with meals) for 30 days. Qty: 60 Tablet, Refills: 2  Start date: 3/18/2022, End date: 4/17/2022      furosemide (Lasix) 20 mg tablet Take 1 Tablet by mouth daily for 30 days. Qty: 30 Tablet, Refills: 1  Start date: 3/18/2022, End date: 4/17/2022         CONTINUE these medications which have NOT CHANGED    Details   polyethylene glycol (MIRALAX) 17 gram/dose powder Take 17 g by mouth two (2) times a day.  1 tablespoon with 8 oz of water daily  Indications: constipation  Qty: 1530 g, Refills: 0    Comments: Take bid for 7 days and then daily      albuterol (PROVENTIL HFA, VENTOLIN HFA, PROAIR HFA) 90 mcg/actuation inhaler Take 2 Puffs by inhalation every four (4) hours as needed for Wheezing. Qty: 1 Inhaler, Refills: 0         STOP taking these medications       levoFLOXacin (LEVAQUIN) 500 mg tablet Comments:   Reason for Stopping:         predniSONE (STERAPRED DS) 10 mg dose pack Comments:   Reason for Stopping:                 Patient Follow Up Instructions: Activity: Activity as tolerated  Diet: ADULT DIET Regular  Wound Care: None needed  Follow-up with PCP in  1 week  Follow up with your cardiologist as scheduled   Follow-up tests/labs BMP in one week      Follow-up Information     Follow up With Specialties Details Why Contact Jose Duke, [address] [place] [id number] [facility] [facility]  Go on 3/31/2022 at 1:00 PM for stress test as previously scheduled.  [address] [place] [address]        ________________________________________________________________    Risk of deterioration: Moderate    Condition at Discharge:  Stable  __________________________________________________________________    Disposition  Home with family and home health services    ____________________________________________________________________    Code Status: Full Code  ___________________________________________________________________      Total time in minutes spent coordinating this discharge (includes going over instructions, follow-up, prescriptions, and preparing report for sign off to her PCP) :  >30 minutes    Signed:  Alejandrina Mims MD

## 2022-03-18 NOTE — PROGRESS NOTES
Problem: Falls - Risk of  Goal: *Absence of Falls  Description: Document Cherylle Cockayne Fall Risk and appropriate interventions in the flowsheet. Outcome: Progressing Towards Goal  Note: Fall Risk Interventions:  Mobility Interventions: Bed/chair exit alarm,Patient to call before getting OOB,Utilize walker, cane, or other assistive device         Medication Interventions: Bed/chair exit alarm,Patient to call before getting OOB,Teach patient to arise slowly    Elimination Interventions: Patient to call for help with toileting needs,Stay With Me (per policy),Toileting schedule/hourly rounds,Bed/chair exit alarm,Call light in reach              Problem: Pressure Injury - Risk of  Goal: *Prevention of pressure injury  Description: Document Rodrigo Scale and appropriate interventions in the flowsheet. Outcome: Progressing Towards Goal  Note: Pressure Injury Interventions:  Sensory Interventions: Float heels,Assess changes in LOC,Assess need for specialty bed,Monitor skin under medical devices,Minimize linen layers,Keep linens dry and wrinkle-free,Turn and reposition approx. every two hours (pillows and wedges if needed)    Moisture Interventions: Check for incontinence Q2 hours and as needed,Minimize layers,Moisture barrier,Maintain skin hydration (lotion/cream),Internal/External urinary devices,Absorbent underpads,Apply protective barrier, creams and emollients    Activity Interventions: Assess need for specialty bed,Pressure redistribution bed/mattress(bed type)    Mobility Interventions: HOB 30 degrees or less,Assess need for specialty bed,Turn and reposition approx.  every two hours(pillow and wedges)    Nutrition Interventions: Document food/fluid/supplement intake,Offer support with meals,snacks and hydration    Friction and Shear Interventions: Lift sheet,HOB 30 degrees or less,Minimize layers,Lift team/patient mobility team,Feet elevated on foot rest,Foam dressings/transparent film/skin sealants,Apply protective barrier, creams and emollients

## 2022-03-21 ENCOUNTER — PATIENT OUTREACH (OUTPATIENT)
Dept: CASE MANAGEMENT | Age: 87
End: 2022-03-21

## 2022-03-21 NOTE — PROGRESS NOTES
Care Transitions Outreach Attempt    Call within 2 business days of discharge: Yes   Attempted to reach patient for transitions of care follow up. Unable to reach patient. Patient nephew Luisito Lopez who is her Primary Decision maker is out of town until Friday when patient has an appt. Luisito Loepz does not know what medication she is on not having her medication list with him. Patient: Misha Marcelino Patient : 1933 MRN: [id number]    Last Discharge [facility] Facility       Complaint Diagnosis Description Type Department Provider    3/14/22 Shortness of Breath; Abdominal Pain Acute on chronic congestive heart failure, unspecified heart failure type ([facility] [address] . .. ED to Hosp-Admission (Discharged) (ADMIT) Bryson Duarte MD; Yoan Lugo... Was this an external facility discharge? No     Noted following upcoming appointments from discharge chart review:   [facility] follow up appointment(s): No future appointments.   Non-[facility] follow up appointment(s): PCP- Dr Craig Sidhu 3/25/22 at 2:30pm  Ranjana Quarles 3/31/22 at 1 pm.

## 2022-03-25 ENCOUNTER — PATIENT OUTREACH (OUTPATIENT)
Dept: CASE MANAGEMENT | Age: 87
End: 2022-03-25

## 2022-03-25 NOTE — PROGRESS NOTES
Care Transitions Initial Call    Call within 2 business days of discharge: Yes     Patient: Bola Wills Patient : 1933 MRN: [id number]    [facility]       Complaint Diagnosis Description Type Department Provider    3/14/22 Shortness of Breath; Abdominal Pain Acute on chronic congestive heart failure, unspecified heart failure type ([facility] [address] . .. ED to Hosp-Admission (Discharged) (ADMIT) Priyank Coppola MD; Nasreen Darling... Was this an external facility discharge? No     Challenges to be reviewed by the provider   Additional needs identified to be addressed with provider: yes  Does not have a current HIPAA on file         Method of communication with provider : none    Discussed COVID-19 related testing which was not done at this time. Test results were not done. Patient informed of results, if available? no     Advance Care Planning:   Does patient have an Advance Directive:  yes; reviewed and current     Inpatient Readmission Risk score: Unplanned Readmit Risk Score: 12.2 ( )    Was this a readmission? no   Patient stated reason for the admission: SOB    Patients top risk factors for readmission: medical condition-HF, HTN, Afib   Interventions to address risk factors: Scheduled appointment with PCP-Dr SUAZO [facility] 3/25/22 at 2:30pm, Scheduled appointment with [address] [place] [address] Road on 3/31/22 at 1pm, Podiatry on 3/29/22 and Assessment and support for treatment adherence and medication management-HF, HTN, Afib    Care Transition Nurse (CTN) contacted the patient by telephone to perform post hospital discharge assessment. Verified name and  with patient as identifiers. Provided introduction to self, and explanation of the CTN role. CTN reviewed discharge instructions, medical action plan and red flags with patient who verbalized understanding. Were discharge instructions available to patient? yes.  Reviewed appropriate site of care based on symptoms and resources available to patient including: PCP and Specialist. Patient given an opportunity to ask questions and does not have any further questions or concerns at this time. The patient agrees to contact the PCP office for questions related to their healthcare. Medication reconciliation was performed with patient, who verbalizes understanding of administration of home medications. Referral to Pharm D needed: no     Home Health/Outpatient orders at discharge: none    Durable Medical Equipment ordered at discharge: None    Covid Risk Education    Per nephew patient has had 1 [employer] vaccine only    Was patient discharged with a pulse oximeter? no. Discussed and confirmed pulse oximeter discharge instructions and when to notify provider or seek emergency care. Discussed follow-up appointments. If no appointment was previously scheduled, appointment scheduling offered: yes. Is follow up appointment scheduled within 7 days of discharge? yes. [facility] follow up appointment(s): No future appointments. Non-[facility] follow up appointment(s): PCP- Dr Praneeth Estrada today at 2:30pm  [facility] on 3/31 at 1 pm, Podiatry on 3/29/22    Plan for follow-up call in 7-10 days based on severity of symptoms and risk factors. CTN provided contact information for future needs. Goals Addressed                 This Visit's Progress     Attends follow-up appointments as directed. 03/25/22    Patient has an appt with Dr Praneeth Estrada today at 2:30pm.   Patient has an app[facility] with [facility] on 3/31/22 at 1pm   Patient has a Podiatry appt on 3/29. Monitor status of these appt on next call. Tobi Hill MSN, RN, [facility] / Care Transition Nurse / [phone]        Understands red flags post discharge. 03/25/22    Patient has SOB on exertion, a cough when she first gets up, denies pedal edema.  Patient has a scale and yesterday weighted 151 lbs.    Patient is up from bed to transfer to / and uses this around the house.   Patient has 24/7 caregivers and a nephew Flex Burris who also stays there.  Patient is following her low sodium diet. On next call monitor SOB, cough, pedal edema daily weight. Review S/S HF and low sodium diet.     Danny Adame MSN, RN, CCM / Care Transition Nurse / [phone]

## 2022-04-01 ENCOUNTER — PATIENT OUTREACH (OUTPATIENT)
Dept: CASE MANAGEMENT | Age: 87
End: 2022-04-01

## 2022-04-01 LAB
ARTERIAL PATENCY WRIST A: YES
BASE EXCESS BLDA CALC-SCNC: 9.6 MMOL/L
BDY SITE: ABNORMAL
HCO3 BLDA-SCNC: 37 MMOL/L (ref 22–26)
PCO2 BLDA: 62 MMHG (ref 35–45)
PH BLDA: 7.4 [PH] (ref 7.35–7.45)
PO2 BLDA: 45 MMHG (ref 80–100)
SAO2 % BLD: 79 % (ref 92–97)
SAO2% DEVICE SAO2% SENSOR NAME: ABNORMAL
SERVICE CMNT-IMP: ABNORMAL
SPECIMEN SITE: ABNORMAL

## 2022-04-01 NOTE — PROGRESS NOTES
Goals Addressed                 This Visit's Progress     Attends follow-up appointments as directed. 03/25/22    Patient has an appt with Dr Charisma Kinsey today at 2:30pm.   Patient has an appt with [facility] on 3/31/22 at 1pm   Patient has a Podiatry appt on 3/29. Monitor status of these appt on next call. Mode REVELES, RN, CCM / Care Transition Nurse / [phone]     04/01/22   Gabby Fernández to patient nephew they did attend Cardiology appt yesterday as scheduled. oMde REVELES, RN, CCM / Care Transition Nurse / [phone]        Understands red flags post discharge. 03/25/22    Patient has SOB on exertion, a cough when she first gets up, denies pedal edema.  Patient has a scale and yesterday weighted 151 lbs.  Patient is up from bed to transfer to / and uses this around the house.  Patient has 24/7 caregivers and a nephew Chaitanya Calderón who also stays there.  Patient is following her low sodium diet. On next call monitor SOB, cough, pedal edema daily weight. Review S/S HF and low sodium diet. Mode REVELES, RN, CCM / Care Transition Nurse / [phone]     04/01/22     Care Transitions Outreach Attempt-LMTCB.     Mode REVELES, RN, CCM / Care Transition Nurse / [phone]

## 2022-04-11 ENCOUNTER — PATIENT OUTREACH (OUTPATIENT)
Dept: CASE MANAGEMENT | Age: 87
End: 2022-04-11

## 2022-04-11 NOTE — PROGRESS NOTES
Goals Addressed                 This Visit's Progress     Attends follow-up appointments as directed. 03/25/22    Patient has an appt with Dr Emmanuel Baca today at 2:30pm.   Patient has an app[facility] with [facility] on 3/31/22 at 1pm   Patient has a Podiatry appt on 3/29. Monitor status of these appt on next call. Samantha REVELES, RN, CCM / Care Transition Nurse / [phone]     04/01/22   Amrita Calderon to patient nephew they did attend Cardiology appt yesterday as scheduled. Samantha REVELES, RN, CCM / Care Transition Nurse / [phone]     04/11/22   Care Transitions Outreach Attempt-LMTCB. Samantha REVELES, RN, [facility] / Care Transition Nurse / [phone]         Understands red flags post discharge. 03/25/22    Patient has SOB on exertion, a cough when she first gets up, denies pedal edema.  Patient has a scale and yesterday weighted 151 lbs.  Patient is up from bed to transfer to / and uses this around the house.  Patient has 24/7 caregivers and a nephew Tanya Torres who also stays there.  Patient is following her low sodium diet. On next call monitor SOB, cough, pedal edema daily weight. Review S/S HF and low sodium diet. Samantha REVELES, RN, [facility] / Care Transition Nurse / [phone]     04/01/22     Care Transitions Outreach Attempt-LMTCB. Samantha REVELES RN, CCM / Care Transition Nurse / [phone]      04/11/22     Care Transitions Outreach Attempt-LMTCB.     Samantha REVELES, RN, [facility] / Care Transition Nurse / [phone]

## 2022-04-18 LAB
ARTERIAL PATENCY WRIST A: YES
BASE EXCESS BLD CALC-SCNC: 8 MMOL/L
BDY SITE: ABNORMAL
GAS FLOW.O2 O2 DELIVERY SYS: ABNORMAL L/MIN
HCO3 BLD-SCNC: 35.4 MMOL/L (ref 22–26)
NITRIC:PPM ISTAT,INITR: ABNORMAL PPM
PCO2 BLD: 58.1 MMHG (ref 35–45)
PH BLD: 7.39 [PH] (ref 7.35–7.45)
PO2 BLD: 44 MMHG (ref 80–100)
SAO2 % BLD: 77 % (ref 92–97)
SPECIMEN TYPE: ABNORMAL

## 2022-04-21 ENCOUNTER — PATIENT OUTREACH (OUTPATIENT)
Dept: CASE MANAGEMENT | Age: 87
End: 2022-04-21

## 2022-04-21 NOTE — PROGRESS NOTES
Patient has graduated from the Transitions of Care Coordination  program on 4/21/22. Patient/family has the ability to self-manage at this time Care management goals have been completed. Patient was not referred to the [facility] team for further management. Goals Addressed                 This Visit's Progress     COMPLETED: Attends follow-up appointments as directed. 03/25/22    Patient has an appt with Dr Viri Blanton today at 2:30pm.   Patient has an appt with [facility] on 3/31/22 at 1pm   Patient has a Podiatry appt on 3/29. Monitor status of these appt on next call. Audrey REVELES, RN, [facility] / Care Transition Nurse / [phone]     04/01/22   Rosemarie Chavis to patient nephew they did attend Cardiology appt yesterday as scheduled. Audrey REVELES, RN, [facility] / Care Transition Nurse / [phone]     04/11/22   Care Transitions Outreach Attempt-LMTCB. Audrey REVELES, RN, [facility] / Care Transition Nurse / [phone]      04/21/22   Audrey REVELES, RN, [facility] / Care Transition Nurse / [phone]        COMPLETED: Understands red flags post discharge. 03/25/22    Patient has SOB on exertion, a cough when she first gets up, denies pedal edema.  Patient has a scale and yesterday weighted 151 lbs.  Patient is up from bed to transfer to / and uses this around the house.  Patient has 24/7 caregivers and a nephew Yariel Briceno who also stays there.  Patient is following her low sodium diet. On next call monitor SOB, cough, pedal edema daily weight. Review S/S HF and low sodium diet. Audrey REVELES, RN, [facility] / Care Transition Nurse / [phone]     04/01/22     Care Transitions Outreach Attempt-LMTCB. Audrey REVELES, RN, [facility] / Care Transition Nurse / [phone]      04/11/22     Care Transitions Outreach Attempt-LMTCB.     Audrey REVELES, RN, [facility] / Care Transition Nurse / [phone]      04/21/22   Ashley June and spoke to patient nephew, patient is doing fine, denies any SOB or cough.  They are \"on their way to [employer]\". Harley Garcia MSN, RN, [facility] / Care Transition Nurse / [phone]             Patient has Care Transition Nurse's contact information for any further questions, concerns, or needs. Patients upcoming visits:  No future appointments.